# Patient Record
Sex: MALE | Race: ASIAN | NOT HISPANIC OR LATINO | URBAN - METROPOLITAN AREA
[De-identification: names, ages, dates, MRNs, and addresses within clinical notes are randomized per-mention and may not be internally consistent; named-entity substitution may affect disease eponyms.]

---

## 2017-11-02 ENCOUNTER — INPATIENT (INPATIENT)
Facility: HOSPITAL | Age: 52
LOS: 3 days | Discharge: HOME | DRG: 65 | End: 2017-11-06
Attending: NEUROLOGICAL SURGERY | Admitting: NEUROLOGICAL SURGERY
Payer: COMMERCIAL

## 2017-11-02 ENCOUNTER — TRANSCRIPTION ENCOUNTER (OUTPATIENT)
Age: 52
End: 2017-11-02

## 2017-11-02 VITALS
RESPIRATION RATE: 18 BRPM | TEMPERATURE: 99 F | DIASTOLIC BLOOD PRESSURE: 94 MMHG | SYSTOLIC BLOOD PRESSURE: 150 MMHG | HEART RATE: 83 BPM | OXYGEN SATURATION: 100 %

## 2017-11-02 DIAGNOSIS — I60.9 NONTRAUMATIC SUBARACHNOID HEMORRHAGE, UNSPECIFIED: ICD-10-CM

## 2017-11-02 LAB
ALBUMIN SERPL ELPH-MCNC: 4.4 G/DL — SIGNIFICANT CHANGE UP (ref 3.3–5)
ALP SERPL-CCNC: 88 U/L — SIGNIFICANT CHANGE UP (ref 40–120)
ALT FLD-CCNC: 31 U/L RC — SIGNIFICANT CHANGE UP (ref 10–45)
ANION GAP SERPL CALC-SCNC: 13 MMOL/L — SIGNIFICANT CHANGE UP (ref 5–17)
ANION GAP SERPL CALC-SCNC: 14 MMOL/L — SIGNIFICANT CHANGE UP (ref 5–17)
APPEARANCE UR: CLEAR — SIGNIFICANT CHANGE UP
APTT BLD: 35 SEC — SIGNIFICANT CHANGE UP (ref 27.5–37.4)
AST SERPL-CCNC: 27 U/L — SIGNIFICANT CHANGE UP (ref 10–40)
BASE EXCESS BLDV CALC-SCNC: 2.6 MMOL/L — HIGH (ref -2–2)
BASOPHILS # BLD AUTO: 0 K/UL — SIGNIFICANT CHANGE UP (ref 0–0.2)
BASOPHILS NFR BLD AUTO: 0.2 % — SIGNIFICANT CHANGE UP (ref 0–2)
BILIRUB SERPL-MCNC: 1.2 MG/DL — SIGNIFICANT CHANGE UP (ref 0.2–1.2)
BILIRUB UR-MCNC: NEGATIVE — SIGNIFICANT CHANGE UP
BLD GP AB SCN SERPL QL: NEGATIVE — SIGNIFICANT CHANGE UP
BUN SERPL-MCNC: 11 MG/DL — SIGNIFICANT CHANGE UP (ref 7–23)
BUN SERPL-MCNC: 12 MG/DL — SIGNIFICANT CHANGE UP (ref 7–23)
CA-I SERPL-SCNC: 1.18 MMOL/L — SIGNIFICANT CHANGE UP (ref 1.12–1.3)
CALCIUM SERPL-MCNC: 9.1 MG/DL — SIGNIFICANT CHANGE UP (ref 8.4–10.5)
CALCIUM SERPL-MCNC: 9.7 MG/DL — SIGNIFICANT CHANGE UP (ref 8.4–10.5)
CHLORIDE BLDV-SCNC: 97 MMOL/L — SIGNIFICANT CHANGE UP (ref 96–108)
CHLORIDE SERPL-SCNC: 94 MMOL/L — LOW (ref 96–108)
CHLORIDE SERPL-SCNC: 96 MMOL/L — SIGNIFICANT CHANGE UP (ref 96–108)
CO2 BLDV-SCNC: 29 MMOL/L — SIGNIFICANT CHANGE UP (ref 22–30)
CO2 SERPL-SCNC: 24 MMOL/L — SIGNIFICANT CHANGE UP (ref 22–31)
CO2 SERPL-SCNC: 24 MMOL/L — SIGNIFICANT CHANGE UP (ref 22–31)
COLOR SPEC: COLORLESS — SIGNIFICANT CHANGE UP
CREAT SERPL-MCNC: 1.06 MG/DL — SIGNIFICANT CHANGE UP (ref 0.5–1.3)
CREAT SERPL-MCNC: 1.25 MG/DL — SIGNIFICANT CHANGE UP (ref 0.5–1.3)
DIFF PNL FLD: NEGATIVE — SIGNIFICANT CHANGE UP
EOSINOPHIL # BLD AUTO: 0 K/UL — SIGNIFICANT CHANGE UP (ref 0–0.5)
EOSINOPHIL NFR BLD AUTO: 0.3 % — SIGNIFICANT CHANGE UP (ref 0–6)
ERYTHROCYTE [SEDIMENTATION RATE] IN BLOOD: 29 MM/HR — HIGH (ref 0–20)
GAS PNL BLDV: 131 MMOL/L — LOW (ref 136–145)
GAS PNL BLDV: SIGNIFICANT CHANGE UP
GLUCOSE BLDV-MCNC: 108 MG/DL — HIGH (ref 70–99)
GLUCOSE SERPL-MCNC: 101 MG/DL — HIGH (ref 70–99)
GLUCOSE SERPL-MCNC: 108 MG/DL — HIGH (ref 70–99)
GLUCOSE UR QL: NEGATIVE — SIGNIFICANT CHANGE UP
HCO3 BLDV-SCNC: 28 MMOL/L — SIGNIFICANT CHANGE UP (ref 21–29)
HCT VFR BLD CALC: 46.3 % — SIGNIFICANT CHANGE UP (ref 39–50)
HCT VFR BLD CALC: 48.1 % — SIGNIFICANT CHANGE UP (ref 39–50)
HCT VFR BLDA CALC: 52 % — HIGH (ref 39–50)
HGB BLD CALC-MCNC: 16.9 G/DL — SIGNIFICANT CHANGE UP (ref 13–17)
HGB BLD-MCNC: 16.4 G/DL — SIGNIFICANT CHANGE UP (ref 13–17)
HGB BLD-MCNC: 16.6 G/DL — SIGNIFICANT CHANGE UP (ref 13–17)
INR BLD: 1.2 RATIO — HIGH (ref 0.88–1.16)
KETONES UR-MCNC: NEGATIVE — SIGNIFICANT CHANGE UP
LACTATE BLDV-MCNC: 1.3 MMOL/L — SIGNIFICANT CHANGE UP (ref 0.7–2)
LEUKOCYTE ESTERASE UR-ACNC: NEGATIVE — SIGNIFICANT CHANGE UP
LYMPHOCYTES # BLD AUTO: 2.5 K/UL — SIGNIFICANT CHANGE UP (ref 1–3.3)
LYMPHOCYTES # BLD AUTO: 20 % — SIGNIFICANT CHANGE UP (ref 13–44)
MAGNESIUM SERPL-MCNC: 2.1 MG/DL — SIGNIFICANT CHANGE UP (ref 1.6–2.6)
MCHC RBC-ENTMCNC: 31.9 PG — SIGNIFICANT CHANGE UP (ref 27–34)
MCHC RBC-ENTMCNC: 32.9 PG — SIGNIFICANT CHANGE UP (ref 27–34)
MCHC RBC-ENTMCNC: 34.5 GM/DL — SIGNIFICANT CHANGE UP (ref 32–36)
MCHC RBC-ENTMCNC: 35.4 GM/DL — SIGNIFICANT CHANGE UP (ref 32–36)
MCV RBC AUTO: 92.5 FL — SIGNIFICANT CHANGE UP (ref 80–100)
MCV RBC AUTO: 92.9 FL — SIGNIFICANT CHANGE UP (ref 80–100)
MONOCYTES # BLD AUTO: 1.1 K/UL — HIGH (ref 0–0.9)
MONOCYTES NFR BLD AUTO: 8.9 % — SIGNIFICANT CHANGE UP (ref 2–14)
NEUTROPHILS # BLD AUTO: 8.7 K/UL — HIGH (ref 1.8–7.4)
NEUTROPHILS NFR BLD AUTO: 70.6 % — SIGNIFICANT CHANGE UP (ref 43–77)
NITRITE UR-MCNC: NEGATIVE — SIGNIFICANT CHANGE UP
OTHER CELLS CSF MANUAL: 12 ML/DL — LOW (ref 18–22)
PCO2 BLDV: 46 MMHG — SIGNIFICANT CHANGE UP (ref 35–50)
PH BLDV: 7.4 — SIGNIFICANT CHANGE UP (ref 7.35–7.45)
PH UR: 7 — SIGNIFICANT CHANGE UP (ref 5–8)
PHOSPHATE SERPL-MCNC: 2.9 MG/DL — SIGNIFICANT CHANGE UP (ref 2.5–4.5)
PLATELET # BLD AUTO: 304 K/UL — SIGNIFICANT CHANGE UP (ref 150–400)
PLATELET # BLD AUTO: 330 K/UL — SIGNIFICANT CHANGE UP (ref 150–400)
PO2 BLDV: 26 MMHG — SIGNIFICANT CHANGE UP (ref 25–45)
POTASSIUM BLDV-SCNC: 4.1 MMOL/L — SIGNIFICANT CHANGE UP (ref 3.5–5)
POTASSIUM SERPL-MCNC: 4.5 MMOL/L — SIGNIFICANT CHANGE UP (ref 3.5–5.3)
POTASSIUM SERPL-MCNC: 4.5 MMOL/L — SIGNIFICANT CHANGE UP (ref 3.5–5.3)
POTASSIUM SERPL-SCNC: 4.5 MMOL/L — SIGNIFICANT CHANGE UP (ref 3.5–5.3)
POTASSIUM SERPL-SCNC: 4.5 MMOL/L — SIGNIFICANT CHANGE UP (ref 3.5–5.3)
PROT SERPL-MCNC: 8.1 G/DL — SIGNIFICANT CHANGE UP (ref 6–8.3)
PROT UR-MCNC: NEGATIVE — SIGNIFICANT CHANGE UP
PROTHROM AB SERPL-ACNC: 13.1 SEC — HIGH (ref 9.8–12.7)
RAPID RVP RESULT: SIGNIFICANT CHANGE UP
RBC # BLD: 4.99 M/UL — SIGNIFICANT CHANGE UP (ref 4.2–5.8)
RBC # BLD: 5.2 M/UL — SIGNIFICANT CHANGE UP (ref 4.2–5.8)
RBC # FLD: 11.8 % — SIGNIFICANT CHANGE UP (ref 10.3–14.5)
RBC # FLD: 11.9 % — SIGNIFICANT CHANGE UP (ref 10.3–14.5)
RH IG SCN BLD-IMP: POSITIVE — SIGNIFICANT CHANGE UP
RH IG SCN BLD-IMP: POSITIVE — SIGNIFICANT CHANGE UP
SAO2 % BLDV: 50 % — LOW (ref 67–88)
SODIUM SERPL-SCNC: 132 MMOL/L — LOW (ref 135–145)
SODIUM SERPL-SCNC: 133 MMOL/L — LOW (ref 135–145)
SP GR SPEC: >1.03 — HIGH (ref 1.01–1.02)
UROBILINOGEN FLD QL: NEGATIVE — SIGNIFICANT CHANGE UP
WBC # BLD: 12.3 K/UL — HIGH (ref 3.8–10.5)
WBC # BLD: 12.9 K/UL — HIGH (ref 3.8–10.5)
WBC # FLD AUTO: 12.3 K/UL — HIGH (ref 3.8–10.5)
WBC # FLD AUTO: 12.9 K/UL — HIGH (ref 3.8–10.5)

## 2017-11-02 PROCEDURE — 70450 CT HEAD/BRAIN W/O DYE: CPT | Mod: 26,59

## 2017-11-02 PROCEDURE — 99291 CRITICAL CARE FIRST HOUR: CPT

## 2017-11-02 PROCEDURE — 93010 ELECTROCARDIOGRAM REPORT: CPT

## 2017-11-02 PROCEDURE — 71010: CPT | Mod: 26

## 2017-11-02 PROCEDURE — 70498 CT ANGIOGRAPHY NECK: CPT | Mod: 26

## 2017-11-02 PROCEDURE — 99285 EMERGENCY DEPT VISIT HI MDM: CPT | Mod: 25

## 2017-11-02 PROCEDURE — 70496 CT ANGIOGRAPHY HEAD: CPT | Mod: 26

## 2017-11-02 RX ORDER — ACETAMINOPHEN 500 MG
650 TABLET ORAL EVERY 6 HOURS
Qty: 0 | Refills: 0 | Status: DISCONTINUED | OUTPATIENT
Start: 2017-11-02 | End: 2017-11-03

## 2017-11-02 RX ORDER — DOCUSATE SODIUM 100 MG
100 CAPSULE ORAL
Qty: 0 | Refills: 0 | Status: DISCONTINUED | OUTPATIENT
Start: 2017-11-02 | End: 2017-11-06

## 2017-11-02 RX ORDER — LEVETIRACETAM 250 MG/1
1000 TABLET, FILM COATED ORAL EVERY 12 HOURS
Qty: 0 | Refills: 0 | Status: DISCONTINUED | OUTPATIENT
Start: 2017-11-02 | End: 2017-11-02

## 2017-11-02 RX ORDER — NIMODIPINE 60 MG/10ML
60 SOLUTION ORAL EVERY 4 HOURS
Qty: 0 | Refills: 0 | Status: DISCONTINUED | OUTPATIENT
Start: 2017-11-02 | End: 2017-11-02

## 2017-11-02 RX ORDER — ONDANSETRON 8 MG/1
4 TABLET, FILM COATED ORAL ONCE
Qty: 0 | Refills: 0 | Status: COMPLETED | OUTPATIENT
Start: 2017-11-02 | End: 2017-11-02

## 2017-11-02 RX ORDER — METOCLOPRAMIDE HCL 10 MG
10 TABLET ORAL ONCE
Qty: 0 | Refills: 0 | Status: COMPLETED | OUTPATIENT
Start: 2017-11-02 | End: 2017-11-02

## 2017-11-02 RX ORDER — OXYCODONE AND ACETAMINOPHEN 5; 325 MG/1; MG/1
2 TABLET ORAL EVERY 6 HOURS
Qty: 0 | Refills: 0 | Status: DISCONTINUED | OUTPATIENT
Start: 2017-11-02 | End: 2017-11-04

## 2017-11-02 RX ORDER — SODIUM CHLORIDE 9 MG/ML
3 INJECTION INTRAMUSCULAR; INTRAVENOUS; SUBCUTANEOUS ONCE
Qty: 0 | Refills: 0 | Status: COMPLETED | OUTPATIENT
Start: 2017-11-02 | End: 2017-11-02

## 2017-11-02 RX ORDER — SODIUM CHLORIDE 9 MG/ML
1000 INJECTION INTRAMUSCULAR; INTRAVENOUS; SUBCUTANEOUS ONCE
Qty: 0 | Refills: 0 | Status: COMPLETED | OUTPATIENT
Start: 2017-11-02 | End: 2017-11-02

## 2017-11-02 RX ORDER — NICARDIPINE HYDROCHLORIDE 30 MG/1
3 CAPSULE, EXTENDED RELEASE ORAL
Qty: 40 | Refills: 0 | Status: DISCONTINUED | OUTPATIENT
Start: 2017-11-02 | End: 2017-11-02

## 2017-11-02 RX ORDER — SENNA PLUS 8.6 MG/1
1 TABLET ORAL DAILY
Qty: 0 | Refills: 0 | Status: DISCONTINUED | OUTPATIENT
Start: 2017-11-02 | End: 2017-11-06

## 2017-11-02 RX ORDER — OXYCODONE AND ACETAMINOPHEN 5; 325 MG/1; MG/1
1 TABLET ORAL EVERY 6 HOURS
Qty: 0 | Refills: 0 | Status: DISCONTINUED | OUTPATIENT
Start: 2017-11-02 | End: 2017-11-04

## 2017-11-02 RX ORDER — ACETAMINOPHEN 500 MG
1000 TABLET ORAL ONCE
Qty: 0 | Refills: 0 | Status: COMPLETED | OUTPATIENT
Start: 2017-11-02 | End: 2017-11-02

## 2017-11-02 RX ORDER — DEXTROSE MONOHYDRATE, SODIUM CHLORIDE, AND POTASSIUM CHLORIDE 50; .745; 4.5 G/1000ML; G/1000ML; G/1000ML
1000 INJECTION, SOLUTION INTRAVENOUS
Qty: 0 | Refills: 0 | Status: DISCONTINUED | OUTPATIENT
Start: 2017-11-02 | End: 2017-11-03

## 2017-11-02 RX ADMIN — Medication 400 MILLIGRAM(S): at 17:16

## 2017-11-02 RX ADMIN — NICARDIPINE HYDROCHLORIDE 15 MG/HR: 30 CAPSULE, EXTENDED RELEASE ORAL at 18:17

## 2017-11-02 RX ADMIN — ONDANSETRON 4 MILLIGRAM(S): 8 TABLET, FILM COATED ORAL at 17:25

## 2017-11-02 RX ADMIN — Medication 10 MILLIGRAM(S): at 17:17

## 2017-11-02 RX ADMIN — Medication 1000 MILLIGRAM(S): at 18:16

## 2017-11-02 RX ADMIN — LEVETIRACETAM 400 MILLIGRAM(S): 250 TABLET, FILM COATED ORAL at 18:00

## 2017-11-02 RX ADMIN — SODIUM CHLORIDE 3 MILLILITER(S): 9 INJECTION INTRAMUSCULAR; INTRAVENOUS; SUBCUTANEOUS at 18:17

## 2017-11-02 RX ADMIN — SODIUM CHLORIDE 2000 MILLILITER(S): 9 INJECTION INTRAMUSCULAR; INTRAVENOUS; SUBCUTANEOUS at 18:17

## 2017-11-02 NOTE — DISCHARGE NOTE ADULT - CARE PROVIDER_API CALL
Remy Chu), Internal Medicine; Medical Oncology  [address] [facility]  [address]  [place] [address]  Phone: [phone]  Fax: [phone]    Javy Baumann), Neurological Surgery  [address]  [address] [place] [address]  Phone: [phone]  Fax: [phone]    Eduar Stewart), Neurology; Vascular Neurology  [address]  [address] [place] [address]  Phone: [phone]  Fax: [phone] Remy Chu (MD), Internal Medicine; Medical Oncology  [address] [facility]  [address]  [place] [address]  Phone: [phone]  Fax: [phone]    Javy Baumann (MD), Neurological Surgery  [address]  [address] [place] [address]  Phone: [phone]  Fax: [phone]    Eduar Stewart (MD), Neurology; Vascular Neurology  [address]  [address] [place] [address]  Phone: [phone]  Fax: [phone]    Juni Martinez (DO), Neurology; Vascular Neurology  [address] [facility] [address]  [place] [address]  Phone: [phone]  Fax: [phone]

## 2017-11-02 NOTE — DISCHARGE NOTE ADULT - PLAN OF CARE
clinical improvement in headache above diet.  activity as tolerated, non strenuous  must have repeat sodium level (bmp/chemistry) in 5 days as outpatient

## 2017-11-02 NOTE — DISCHARGE NOTE ADULT - HOSPITAL COURSE
52M with no significant past medical history presents with severe sudden onset headache on 10/26 that woke him up out of sleep in the morning with associated nausea, vomiting, malaise. Because of persistent symptoms he came to ED where CT head showed trace high R frontal parietal SAH. No history of trauma. He has had associated fevers since headache began, last fever yesterday 101.9. He denies history of valvular disease, IV drug use. Prior to this week he has been feeling healthy although with much stress in his personal life. He was previously on ASA81, last dose 10/26.    Otherwise healthy male presents to hospital 11/2 after onset of headache originally on 10/26.  had cerebral angio  done 11/3 with negative result, had negative brain MRI/MRA/MRV on 11/4.  initially presented with sodium 132, now being discharged  with sodium level 135.  importance of need for 5 day followup serum sodium check reviewed, needs to follow up with neurosurgeon  in 1-2 weeks.  pt. will call office for appointment.

## 2017-11-02 NOTE — H&P ADULT - ASSESSMENT
52M with spontaneous high R frontal SAH concerning for aneurysmal ?mycotic rupture. Patient is likely post-bleed day 7 from history and neurologically intact.    -Admit to NSCU under Dr. Baumann  -Neurochesusana q1h  -CTA head/neck  -Keppra  -Nimodipine  -TTE  -SBP <140  -Will plan for angiogram vs. MRI after CTA complete

## 2017-11-02 NOTE — DISCHARGE NOTE ADULT - PATIENT PORTAL LINK FT
“You can access the [facility] Patient Portal, offered by [facility], by registering with the following website: [email or web address]”

## 2017-11-02 NOTE — DISCHARGE NOTE ADULT - CARE PLAN
Principal Discharge DX:	SAH (subarachnoid hemorrhage)  Goal:	clinical improvement in headache  Instructions for follow-up, activity and diet:	above diet.  activity as tolerated, non strenuous  must have repeat sodium level (bmp/chemistry) in 5 days as outpatient

## 2017-11-02 NOTE — PROGRESS NOTE ADULT - ASSESSMENT
52M with fevers and headache found to have high R frontal SAH without any hx of trauma; r/o mycotic aneurysm, cns infection    Assessment:     Neurological: pre-op for angio in AM; consider LP   CT in AM; Neuro Checks q1; Pain Control; PT/OT; OOB     CVS:   SBP Goal:     Pulm:   Maintain sats > 92%    Renal:   IVF while NPO     GI: NPO   Bowel Regimen  GI PPX:  not indicated    ID: culture if spikes    Heme/Onc:   Pt at high risk of dvt on admission [] check dopplers []  SCDS; chemoprophylaxis: hold until angio     Endocrine:   FS; ISS    CODE STATUS: Full Code     DISPOSITION: ICU    Patient is at high risk of neurologic deterioration/death due to: CNS hemorrhage     Time spent: 35 critical care minutes    Hiral Aguiar MD 52M with fevers and headache found to have high R frontal SAH without any hx of trauma; r/o mycotic aneurysm, cns infection    Assessment:     Neurological: pre-op for angio in AM; consider LP   CT in AM; Neuro Checks q1; Pain Control; PT/OT; OOB   send CRP, ESR, utox    CVS:   SBP Goal:     Pulm:   Maintain sats > 92%    Renal:   IVF while NPO     GI: NPO   Bowel Regimen  GI PPX:  not indicated    ID: culture if spikes    Heme/Onc:   Pt at high risk of dvt on admission [] check dopplers []  SCDS; chemoprophylaxis: hold until angio     Endocrine:   FS; ISS    CODE STATUS: Full Code     DISPOSITION: ICU    Patient is at high risk of neurologic deterioration/death due to: CNS hemorrhage     Time spent: 35 critical care minutes    Hiral Aguiar MD

## 2017-11-02 NOTE — DISCHARGE NOTE ADULT - ADDITIONAL INSTRUCTIONS
call dr. hung's office for appt. for office visit in 1-2 weeks.    call his office to have sodium level checked within next 5 days.  this is mandatory.

## 2017-11-02 NOTE — DISCHARGE NOTE ADULT - NS AS DC STROKE ED MATERIALS
Risk Factors for Stroke/Stroke Education Booklet/Stroke Warning Signs and Symptoms/Call 911 for Stroke/Prescribed Medications/Need for Followup After Discharge

## 2017-11-02 NOTE — DISCHARGE NOTE ADULT - MEDICATION SUMMARY - MEDICATIONS TO TAKE
I will START or STAY ON the medications listed below when I get home from the hospital:    Tylenol 325 mg oral tablet  -- 2 tab(s) by mouth every 4 hours, As Needed  for pain  -- Indication: For headache    senna oral tablet  -- 1 tab(s) by mouth once a day  -- Indication: For bowel    docusate sodium 100 mg oral capsule  -- 1 cap(s) by mouth 2 times a day  -- Indication: For bowel

## 2017-11-02 NOTE — DISCHARGE NOTE ADULT - CARE PROVIDERS DIRECT ADDRESSES
,[email or web address],DirectAddress_Unknown

## 2017-11-02 NOTE — ED ADULT NURSE NOTE - OBJECTIVE STATEMENT
Pt is an ambulatory 52 yr old male a/oX 3 c/o worst headache of his life that started and woke him from his sleep 5 days ago.  PERRl wnl, nagel with equal strength.  C/o photophobia and nucchal rigidity.  Lungs CTA no chest pain or sob.  NSR on cm at 77 bpm.  C/o fevers, chills and N/V.  Abdomen NT ND with BS+4Q.  SKIN WDI.  Peripheral pulses +2bl no edema

## 2017-11-02 NOTE — ED PROVIDER NOTE - OBJECTIVE STATEMENT
52 y.o. male no medical history on ASA but has not taken for 7 days was awoken from sleep 5 days ago with the worse headache of his life.  Since he has been with persistent headache, + documented fever at home of 101 but none today with some neck stiffness.  Some transient numbness at home but none currently.    Pt taken to CT immediately after I took this history.

## 2017-11-02 NOTE — ED PROVIDER NOTE - ATTENDING CONTRIBUTION TO CARE
Attending MD Jennings:   I personally have seen and examined this patient.  Physician assistant note reviewed and agree on plan of care and except where noted.  See below for details.     52M with no reported PMH/PSH/Meds/Allergies presents to the ED with headache.  Reports that 5 days ago was awakened from sleep with the "worst headache of [his] life".  Reporst associated nausea, vomiting, malaise. Reports that since then the headache has not improved.  Reports decided to come in to the ED because his symptoms have not improved and still has severe headache.  Reports since then has also developed fevers, T 101 at home, denies any fever today.  Reports mild neck stiffness.  Denies change in vision, double vision, sudden loss of vision.  Denies chest pain, shortness of breath, palpitations. Denies abdominal pain, nausea, vomiting, diarrhea, blood in stools. Denies loss of urinary or bowel continence. Denies LOC.  On exam, CN 2-12 grossly intact, head NCAT, PERRL, FROM at neck, no tenderness to palpation or stepoffs along length of spine, lungs CTAB with good inspiratory effort, +S1S2, no m/r/g, abdomen soft with +BS, NT, ND, no CVAT, moving all extremities with 5/5 strength bilateral upper and lower extremities, good and equal  strength bilaterally, sensory grossly intact; A/P: 52M with high suspicion for SAH, patient taken to CT Head emergently after history taken, labs, IVFs, antiemetic, pain control, likely neuro vs neurosx consult, admit

## 2017-11-02 NOTE — DISCHARGE NOTE ADULT - NS AS ACTIVITY OBS
Walking-Indoors allowed/Showering allowed/Walking-Outdoors allowed/Stairs allowed/No Heavy lifting/straining/Do not drive or operate machinery

## 2017-11-02 NOTE — DISCHARGE NOTE ADULT - PROVIDER TOKENS
TOKEN:'[id number]:'[id number]:'[id number]' TOKEN:'[id number]:'[id number]:'[id number]:'[id number]'

## 2017-11-02 NOTE — H&P ADULT - HISTORY OF PRESENT ILLNESS
52M with no significant past medical history presents with severe sudden onset headache on 10/26 that woke him up out of sleep in the morning with associated nausea, vomiting, malaise. Because of persistent symptoms he came to ED where CT head showed trace high R frontal parietal SAH. No history of trauma. He has had associated fevers since headache began, last fever yesterday 101.9. He denies history of valvular disease, IV drug use. Prior to this week he has been feeling healthy although with much stress in his personal life. He was previously on ASA81, last dose 10/26.

## 2017-11-02 NOTE — H&P ADULT - NSHPPHYSICALEXAM_GEN_ALL_CORE
AOx3, Following Commands    CN: PERRL, EOMI, V1-3 intact, no facial droop appreciated, palate elevation symmetric, tongue midline, shoulder shrug 5/5    Motor: 5/5 throughout, no drift    Sensation: intact to light touch    Reflexes: No clonus or babinski    Gait: not assessed

## 2017-11-03 LAB
AMPHET UR-MCNC: NEGATIVE — SIGNIFICANT CHANGE UP
ANION GAP SERPL CALC-SCNC: 12 MMOL/L — SIGNIFICANT CHANGE UP (ref 5–17)
ANION GAP SERPL CALC-SCNC: 16 MMOL/L — SIGNIFICANT CHANGE UP (ref 5–17)
BARBITURATES UR SCN-MCNC: NEGATIVE — SIGNIFICANT CHANGE UP
BASOPHILS # BLD AUTO: 0.1 K/UL — SIGNIFICANT CHANGE UP (ref 0–0.2)
BASOPHILS NFR BLD AUTO: 0.6 % — SIGNIFICANT CHANGE UP (ref 0–2)
BENZODIAZ UR-MCNC: NEGATIVE — SIGNIFICANT CHANGE UP
BUN SERPL-MCNC: 12 MG/DL — SIGNIFICANT CHANGE UP (ref 7–23)
BUN SERPL-MCNC: 14 MG/DL — SIGNIFICANT CHANGE UP (ref 7–23)
CALCIUM SERPL-MCNC: 9.5 MG/DL — SIGNIFICANT CHANGE UP (ref 8.4–10.5)
CALCIUM SERPL-MCNC: 9.6 MG/DL — SIGNIFICANT CHANGE UP (ref 8.4–10.5)
CHLORIDE SERPL-SCNC: 97 MMOL/L — SIGNIFICANT CHANGE UP (ref 96–108)
CHLORIDE SERPL-SCNC: 98 MMOL/L — SIGNIFICANT CHANGE UP (ref 96–108)
CHOLEST SERPL-MCNC: 169 MG/DL — SIGNIFICANT CHANGE UP (ref 10–199)
CO2 SERPL-SCNC: 21 MMOL/L — LOW (ref 22–31)
CO2 SERPL-SCNC: 27 MMOL/L — SIGNIFICANT CHANGE UP (ref 22–31)
COCAINE METAB.OTHER UR-MCNC: NEGATIVE — SIGNIFICANT CHANGE UP
CREAT SERPL-MCNC: 1.08 MG/DL — SIGNIFICANT CHANGE UP (ref 0.5–1.3)
CREAT SERPL-MCNC: 1.23 MG/DL — SIGNIFICANT CHANGE UP (ref 0.5–1.3)
CRP SERPL-MCNC: 0.5 MG/DL — HIGH (ref 0–0.4)
CULTURE RESULTS: NO GROWTH — SIGNIFICANT CHANGE UP
EOSINOPHIL # BLD AUTO: 0.1 K/UL — SIGNIFICANT CHANGE UP (ref 0–0.5)
EOSINOPHIL NFR BLD AUTO: 0.5 % — SIGNIFICANT CHANGE UP (ref 0–6)
GLUCOSE SERPL-MCNC: 133 MG/DL — HIGH (ref 70–99)
GLUCOSE SERPL-MCNC: 79 MG/DL — SIGNIFICANT CHANGE UP (ref 70–99)
HBA1C BLD-MCNC: 5.2 % — SIGNIFICANT CHANGE UP (ref 4–5.6)
HCT VFR BLD CALC: 45.7 % — SIGNIFICANT CHANGE UP (ref 39–50)
HCT VFR BLD CALC: 49.2 % — SIGNIFICANT CHANGE UP (ref 39–50)
HDLC SERPL-MCNC: 61 MG/DL — SIGNIFICANT CHANGE UP (ref 40–125)
HGB BLD-MCNC: 16.2 G/DL — SIGNIFICANT CHANGE UP (ref 13–17)
HGB BLD-MCNC: 16.5 G/DL — SIGNIFICANT CHANGE UP (ref 13–17)
LIPID PNL WITH DIRECT LDL SERPL: 84 MG/DL — SIGNIFICANT CHANGE UP
LYMPHOCYTES # BLD AUTO: 23.9 % — SIGNIFICANT CHANGE UP (ref 13–44)
LYMPHOCYTES # BLD AUTO: 3.1 K/UL — SIGNIFICANT CHANGE UP (ref 1–3.3)
MAGNESIUM SERPL-MCNC: 2.3 MG/DL — SIGNIFICANT CHANGE UP (ref 1.6–2.6)
MCHC RBC-ENTMCNC: 31.6 PG — SIGNIFICANT CHANGE UP (ref 27–34)
MCHC RBC-ENTMCNC: 33 PG — SIGNIFICANT CHANGE UP (ref 27–34)
MCHC RBC-ENTMCNC: 33.5 GM/DL — SIGNIFICANT CHANGE UP (ref 32–36)
MCHC RBC-ENTMCNC: 35.5 GM/DL — SIGNIFICANT CHANGE UP (ref 32–36)
MCV RBC AUTO: 93 FL — SIGNIFICANT CHANGE UP (ref 80–100)
MCV RBC AUTO: 94.4 FL — SIGNIFICANT CHANGE UP (ref 80–100)
METHADONE UR-MCNC: NEGATIVE — SIGNIFICANT CHANGE UP
MONOCYTES # BLD AUTO: 1.2 K/UL — HIGH (ref 0–0.9)
MONOCYTES NFR BLD AUTO: 9.1 % — SIGNIFICANT CHANGE UP (ref 2–14)
NEUTROPHILS # BLD AUTO: 8.6 K/UL — HIGH (ref 1.8–7.4)
NEUTROPHILS NFR BLD AUTO: 65.9 % — SIGNIFICANT CHANGE UP (ref 43–77)
OPIATES UR-MCNC: NEGATIVE — SIGNIFICANT CHANGE UP
OXYCODONE UR-MCNC: NEGATIVE — SIGNIFICANT CHANGE UP
PCP SPEC-MCNC: SIGNIFICANT CHANGE UP
PCP UR-MCNC: NEGATIVE — SIGNIFICANT CHANGE UP
PHOSPHATE SERPL-MCNC: 3.1 MG/DL — SIGNIFICANT CHANGE UP (ref 2.5–4.5)
PLATELET # BLD AUTO: 307 K/UL — SIGNIFICANT CHANGE UP (ref 150–400)
PLATELET # BLD AUTO: 315 K/UL — SIGNIFICANT CHANGE UP (ref 150–400)
POTASSIUM SERPL-MCNC: 4.7 MMOL/L — SIGNIFICANT CHANGE UP (ref 3.5–5.3)
POTASSIUM SERPL-MCNC: 5 MMOL/L — SIGNIFICANT CHANGE UP (ref 3.5–5.3)
POTASSIUM SERPL-SCNC: 4.7 MMOL/L — SIGNIFICANT CHANGE UP (ref 3.5–5.3)
POTASSIUM SERPL-SCNC: 5 MMOL/L — SIGNIFICANT CHANGE UP (ref 3.5–5.3)
RBC # BLD: 4.92 M/UL — SIGNIFICANT CHANGE UP (ref 4.2–5.8)
RBC # BLD: 5.21 M/UL — SIGNIFICANT CHANGE UP (ref 4.2–5.8)
RBC # FLD: 11.6 % — SIGNIFICANT CHANGE UP (ref 10.3–14.5)
RBC # FLD: 11.8 % — SIGNIFICANT CHANGE UP (ref 10.3–14.5)
SODIUM SERPL-SCNC: 134 MMOL/L — LOW (ref 135–145)
SODIUM SERPL-SCNC: 137 MMOL/L — SIGNIFICANT CHANGE UP (ref 135–145)
SPECIMEN SOURCE: SIGNIFICANT CHANGE UP
T4 FREE+ TSH PNL SERPL: 0.83 UIU/ML — SIGNIFICANT CHANGE UP (ref 0.27–4.2)
THC UR QL: NEGATIVE — SIGNIFICANT CHANGE UP
TOTAL CHOLESTEROL/HDL RATIO MEASUREMENT: 2.8 RATIO — LOW (ref 3.4–9.6)
TRIGL SERPL-MCNC: 122 MG/DL — SIGNIFICANT CHANGE UP (ref 10–149)
WBC # BLD: 12.4 K/UL — HIGH (ref 3.8–10.5)
WBC # BLD: 13 K/UL — HIGH (ref 3.8–10.5)
WBC # FLD AUTO: 12.4 K/UL — HIGH (ref 3.8–10.5)
WBC # FLD AUTO: 13 K/UL — HIGH (ref 3.8–10.5)

## 2017-11-03 PROCEDURE — 99291 CRITICAL CARE FIRST HOUR: CPT

## 2017-11-03 PROCEDURE — 36224 PLACE CATH CAROTD ART: CPT | Mod: RT

## 2017-11-03 PROCEDURE — 99255 IP/OBS CONSLTJ NEW/EST HI 80: CPT

## 2017-11-03 PROCEDURE — 70450 CT HEAD/BRAIN W/O DYE: CPT | Mod: 26

## 2017-11-03 PROCEDURE — 36226 PLACE CATH VERTEBRAL ART: CPT | Mod: 50

## 2017-11-03 PROCEDURE — 36223 PLACE CATH CAROTID/INOM ART: CPT | Mod: 59,LT

## 2017-11-03 PROCEDURE — 36227 PLACE CATH XTRNL CAROTID: CPT | Mod: 50

## 2017-11-03 RX ORDER — SODIUM CHLORIDE 9 MG/ML
1 INJECTION INTRAMUSCULAR; INTRAVENOUS; SUBCUTANEOUS EVERY 6 HOURS
Qty: 0 | Refills: 0 | Status: DISCONTINUED | OUTPATIENT
Start: 2017-11-03 | End: 2017-11-06

## 2017-11-03 RX ORDER — ACETAMINOPHEN 500 MG
1000 TABLET ORAL ONCE
Qty: 0 | Refills: 0 | Status: COMPLETED | OUTPATIENT
Start: 2017-11-03 | End: 2017-11-03

## 2017-11-03 RX ORDER — ENOXAPARIN SODIUM 100 MG/ML
40 INJECTION SUBCUTANEOUS
Qty: 0 | Refills: 0 | Status: DISCONTINUED | OUTPATIENT
Start: 2017-11-03 | End: 2017-11-06

## 2017-11-03 RX ADMIN — DEXTROSE MONOHYDRATE, SODIUM CHLORIDE, AND POTASSIUM CHLORIDE 75 MILLILITER(S): 50; .745; 4.5 INJECTION, SOLUTION INTRAVENOUS at 06:56

## 2017-11-03 RX ADMIN — SODIUM CHLORIDE 1 GRAM(S): 9 INJECTION INTRAMUSCULAR; INTRAVENOUS; SUBCUTANEOUS at 05:07

## 2017-11-03 RX ADMIN — Medication 400 MILLIGRAM(S): at 16:20

## 2017-11-03 RX ADMIN — Medication 1000 MILLIGRAM(S): at 16:45

## 2017-11-03 RX ADMIN — Medication 400 MILLIGRAM(S): at 03:00

## 2017-11-03 RX ADMIN — ENOXAPARIN SODIUM 40 MILLIGRAM(S): 100 INJECTION SUBCUTANEOUS at 18:16

## 2017-11-03 RX ADMIN — Medication 400 MILLIGRAM(S): at 10:30

## 2017-11-03 RX ADMIN — OXYCODONE AND ACETAMINOPHEN 1 TABLET(S): 5; 325 TABLET ORAL at 23:30

## 2017-11-03 RX ADMIN — SODIUM CHLORIDE 1 GRAM(S): 9 INJECTION INTRAMUSCULAR; INTRAVENOUS; SUBCUTANEOUS at 19:17

## 2017-11-03 RX ADMIN — Medication 1000 MILLIGRAM(S): at 03:15

## 2017-11-03 RX ADMIN — OXYCODONE AND ACETAMINOPHEN 1 TABLET(S): 5; 325 TABLET ORAL at 23:00

## 2017-11-03 RX ADMIN — SODIUM CHLORIDE 1 GRAM(S): 9 INJECTION INTRAMUSCULAR; INTRAVENOUS; SUBCUTANEOUS at 23:13

## 2017-11-03 RX ADMIN — Medication 1000 MILLIGRAM(S): at 10:45

## 2017-11-03 NOTE — PROGRESS NOTE ADULT - ASSESSMENT
ASSESSMENT/PLAN:  SAH-?CVT seen on angio. MRI pending-including MRV to fully evaluate  TCDS daily. TTE pending  f/u pending cxs    [x] Patient is at high risk of neurologic deterioration/death due to: SAH, cortical vein thrombosis,     Time seen: 10pm  Time spent: __40_ [x] critical care minutes

## 2017-11-03 NOTE — CHART NOTE - NSCHARTNOTEFT_GEN_A_CORE
Interventional Neuro Radiology  Pre-Procedure Note    This is a 53yo right hand dominant male with no significant past medical history presents with severe sudden onset headache on 10/26 that woke him up out of sleep in the morning with associated nausea, vomiting, malaise. Due to persistent symptoms he came to ED where CT head showed trace high right frontal parietal SAH. No history of trauma. He has had associated fevers since headache began, last fever yesterday 101.9. He denies history of valvular disease, IV drug use. Prior to this week he has been feeling healthy although with much stress in his personal life. He was previously on ASA81, last dose 10/26. Patient presents today to neuro IR for selective cerebral angiography, for further evaluation.     Neuro Exam: Awake and alert, oriented x3, fluent, normal naming and repetition, follows 3 step commands. Extraocular movements intact, no nystagmus, visual fields full, face symmetric, tongue midline. No drift, 5/5 power x 4 extremities. Normal sensation to LT. Normal finger-to-nose and rapid alternating movements.    PAST MEDICAL & SURGICAL HISTORY:  No pertinent past medical history  No significant past surgical history      Social History: denies tobacco    FAMILY HISTORY: no pertinent family hx      Allergies: No Known Allergies      Current Medications:   acetaminophen  IVPB. 1000 milliGRAM(s) IV Intermittent once  docusate sodium 100 milliGRAM(s) Oral two times a day  oxyCODONE    5 mG/acetaminophen 325 mG 1 Tablet(s) Oral every 6 hours PRN  senna 1 Tablet(s) Oral daily  sodium chloride 0.9% with potassium chloride 20 mEq/L 1000 milliLiter(s) IV Continuous <Continuous>      Labs:                         16.4   12.9  )-----------( 304      ( 02 Nov 2017 22:02 )             46.3       11-02    133<L>  |  96  |  11  ----------------------------<  101<H>  4.5   |  24  |  1.06    Ca    9.1      02 Nov 2017 22:02  Phos  2.9     11-02  Mg     2.1     11-02    TPro  8.1  /  Alb  4.4  /  TBili  1.2  /  DBili  x   /  AST  27  /  ALT  31  /  AlkPhos  88  11-02      Blood Bank: 11-02-17  O  --  Positive      Assessment/Plan:   This is a 53yo right hand dominant male presents with  right frontal parietal SAH. Patient presents to neuro-IR for selective cerebral angiography. Procedure/ risks/ benefits/ goals/ alternatives were explained. Risks include but are not limited to stroke/ vessel injury/ hemorrhage/ groin hematoma. All questions answered. Informed content obtained from patient. Consent placed in chart.     Mohini Nguyen PA-C  [phone]

## 2017-11-03 NOTE — CHART NOTE - NSCHARTNOTEFT_GEN_A_CORE
Interventional Neuro- Radiology   Procedure Note    Procedure: Selective Cerebral Angiography   Pre- Procedure Diagnosis: SAH  Post- Procedure Diagnosis: Normal angiography- no aneurysm/ no malformation     : Dr. Terry MD    Fellow: Dr. Kym Jackson MD     Physician Assistant: Mohini Nguyen PA-C    RN: Gloria     Anesthesia:  Dr. Gui MD (MAC)      Sheath: 4 Fr short sheath    I/Os:  Fluids: 100cc- DTV  Contrast: 137cc  Estimated Blood Loss: <10cc    Vitals: BP= 121/80   HR= 75     SpO2=  100%    Preliminary Report:  Under MAC, using a 4Fr short sheath to the right groin examination of left vertebral artery/ left common carotid artery/ left external carotid artery/ right vertebral artery/ right common carotid artery/ right internal carotid artery/ right external carotid artery via selective cerebral angiography demonstrates normal angiography, no aneurysms, no arterial venous malformation. ( Official note to follow).    Patient tolerated procedure well, vital signs stable, hemodynamically stable, no change in neurological status compared to baseline. Results discussed with neurosurgery/ patient and their family. Groin sheath d/c'ed, manual compression held to hemostasis, no active bleeding, no hematoma, quick clot and safeguard balloon dressing applied at 12:00h. STAT labs, CBC/BMP at 16:00h. Patient transferred to NSCU for further care/ monitoring.    Mohini Nguyen PA-C  [phone]

## 2017-11-03 NOTE — PROGRESS NOTE ADULT - ASSESSMENT
52M with fevers and headache found to have high R frontal SAH without any hx of trauma.  Negative Angio 11/3.    Neurological:   Neuro Checks q1; Pain Control;   maintain euvolemia  PT/OT; OOB tomorrow     CVS:   SBP Goal:   EKG WNL.    Pulm:   Maintain sats > 92%    Renal:   IVF while NPO     GI: NPO, dysphagia screening in 4-6 hrs, advance diet as tolerated   Bowel Regimen  GI PPX:  not indicated    ID: culture if spikes    Heme/Onc:   SCDS; chemoprophylaxis: hold - negative angio, SAH     Endocrine:   FS; ISS    CODE STATUS: Full Code     DISPOSITION: ICU    Patient is at high risk of neurologic deterioration/death due to: CNS hemorrhage     Time spent: 35 critical care minutes 52M with fevers and headache found to have high R frontal SAH without any hx of trauma.  Negative Angio 11/3. CTA no aneurysm, ? cortical venous thrombosis     Neurological:   Neuro Checks q1; Pain Control;   MRI brain and MRV with contrast   maintain euvolemia  PT/OT; OOB tomorrow    CVS:   SBP Goal:   EKG WNL.    Pulm:   Maintain sats > 92%    Renal:   IVF while NPO     GI: NPO, dysphagia screening in 4-6 hrs, advance diet as tolerated   Bowel Regimen  GI PPX:  not indicated    ID: culture if spikes    Heme/Onc:   SCDS; chemoprophylaxis: hold - negative angio, SAH     Endocrine:   FS; ISS    CODE STATUS: Full Code     DISPOSITION: ICU    Patient is at high risk of neurologic deterioration/death due to: CNS hemorrhage     Time spent: 35 critical care minutes

## 2017-11-04 DIAGNOSIS — R50.9 FEVER, UNSPECIFIED: ICD-10-CM

## 2017-11-04 DIAGNOSIS — I60.9 NONTRAUMATIC SUBARACHNOID HEMORRHAGE, UNSPECIFIED: ICD-10-CM

## 2017-11-04 LAB
ANION GAP SERPL CALC-SCNC: 14 MMOL/L — SIGNIFICANT CHANGE UP (ref 5–17)
APTT BLD: 35 SEC — SIGNIFICANT CHANGE UP (ref 27.5–37.4)
BUN SERPL-MCNC: 14 MG/DL — SIGNIFICANT CHANGE UP (ref 7–23)
CALCIUM SERPL-MCNC: 9.3 MG/DL — SIGNIFICANT CHANGE UP (ref 8.4–10.5)
CHLORIDE SERPL-SCNC: 99 MMOL/L — SIGNIFICANT CHANGE UP (ref 96–108)
CO2 SERPL-SCNC: 24 MMOL/L — SIGNIFICANT CHANGE UP (ref 22–31)
CREAT SERPL-MCNC: 1.22 MG/DL — SIGNIFICANT CHANGE UP (ref 0.5–1.3)
GLUCOSE SERPL-MCNC: 112 MG/DL — HIGH (ref 70–99)
HCT VFR BLD CALC: 45.4 % — SIGNIFICANT CHANGE UP (ref 39–50)
HCYS SERPL-MCNC: 7.3 UMOL/L — SIGNIFICANT CHANGE UP (ref 5–15)
HGB BLD-MCNC: 16.1 G/DL — SIGNIFICANT CHANGE UP (ref 13–17)
INR BLD: 1.21 RATIO — HIGH (ref 0.88–1.16)
MAGNESIUM SERPL-MCNC: 2.2 MG/DL — SIGNIFICANT CHANGE UP (ref 1.6–2.6)
MCHC RBC-ENTMCNC: 33.1 PG — SIGNIFICANT CHANGE UP (ref 27–34)
MCHC RBC-ENTMCNC: 35.4 GM/DL — SIGNIFICANT CHANGE UP (ref 32–36)
MCV RBC AUTO: 93.5 FL — SIGNIFICANT CHANGE UP (ref 80–100)
PHOSPHATE SERPL-MCNC: 3 MG/DL — SIGNIFICANT CHANGE UP (ref 2.5–4.5)
PLATELET # BLD AUTO: 321 K/UL — SIGNIFICANT CHANGE UP (ref 150–400)
POTASSIUM SERPL-MCNC: 5.1 MMOL/L — SIGNIFICANT CHANGE UP (ref 3.5–5.3)
POTASSIUM SERPL-SCNC: 5.1 MMOL/L — SIGNIFICANT CHANGE UP (ref 3.5–5.3)
PROTHROM AB SERPL-ACNC: 13.2 SEC — HIGH (ref 9.8–12.7)
PSA FLD-MCNC: 1.42 NG/ML — SIGNIFICANT CHANGE UP (ref 0–4)
RBC # BLD: 4.85 M/UL — SIGNIFICANT CHANGE UP (ref 4.2–5.8)
RBC # FLD: 11.6 % — SIGNIFICANT CHANGE UP (ref 10.3–14.5)
SODIUM SERPL-SCNC: 137 MMOL/L — SIGNIFICANT CHANGE UP (ref 135–145)
WBC # BLD: 11.1 K/UL — HIGH (ref 3.8–10.5)
WBC # FLD AUTO: 11.1 K/UL — HIGH (ref 3.8–10.5)

## 2017-11-04 PROCEDURE — 93306 TTE W/DOPPLER COMPLETE: CPT | Mod: 26

## 2017-11-04 PROCEDURE — 99291 CRITICAL CARE FIRST HOUR: CPT

## 2017-11-04 PROCEDURE — 93970 EXTREMITY STUDY: CPT | Mod: 26

## 2017-11-04 PROCEDURE — 70553 MRI BRAIN STEM W/O & W/DYE: CPT | Mod: 26

## 2017-11-04 PROCEDURE — 93886 INTRACRANIAL COMPLETE STUDY: CPT | Mod: 26

## 2017-11-04 PROCEDURE — G0452: CPT | Mod: 26

## 2017-11-04 PROCEDURE — 70546 MR ANGIOGRAPH HEAD W/O&W/DYE: CPT | Mod: 26,59

## 2017-11-04 RX ORDER — OXYCODONE HYDROCHLORIDE 5 MG/1
5 TABLET ORAL EVERY 6 HOURS
Qty: 0 | Refills: 0 | Status: DISCONTINUED | OUTPATIENT
Start: 2017-11-04 | End: 2017-11-04

## 2017-11-04 RX ORDER — SODIUM CHLORIDE 9 MG/ML
500 INJECTION INTRAMUSCULAR; INTRAVENOUS; SUBCUTANEOUS ONCE
Qty: 0 | Refills: 0 | Status: COMPLETED | OUTPATIENT
Start: 2017-11-04 | End: 2017-11-04

## 2017-11-04 RX ORDER — SODIUM CHLORIDE 9 MG/ML
1000 INJECTION INTRAMUSCULAR; INTRAVENOUS; SUBCUTANEOUS
Qty: 0 | Refills: 0 | Status: DISCONTINUED | OUTPATIENT
Start: 2017-11-04 | End: 2017-11-05

## 2017-11-04 RX ORDER — ACETAMINOPHEN 500 MG
650 TABLET ORAL EVERY 6 HOURS
Qty: 0 | Refills: 0 | Status: DISCONTINUED | OUTPATIENT
Start: 2017-11-04 | End: 2017-11-06

## 2017-11-04 RX ORDER — OXYCODONE HYDROCHLORIDE 5 MG/1
5 TABLET ORAL ONCE
Qty: 0 | Refills: 0 | Status: DISCONTINUED | OUTPATIENT
Start: 2017-11-04 | End: 2017-11-04

## 2017-11-04 RX ORDER — ATORVASTATIN CALCIUM 80 MG/1
10 TABLET, FILM COATED ORAL AT BEDTIME
Qty: 0 | Refills: 0 | Status: DISCONTINUED | OUTPATIENT
Start: 2017-11-04 | End: 2017-11-04

## 2017-11-04 RX ADMIN — Medication 650 MILLIGRAM(S): at 16:55

## 2017-11-04 RX ADMIN — SODIUM CHLORIDE 1 GRAM(S): 9 INJECTION INTRAMUSCULAR; INTRAVENOUS; SUBCUTANEOUS at 05:53

## 2017-11-04 RX ADMIN — SODIUM CHLORIDE 1 GRAM(S): 9 INJECTION INTRAMUSCULAR; INTRAVENOUS; SUBCUTANEOUS at 23:37

## 2017-11-04 RX ADMIN — Medication 100 MILLIGRAM(S): at 05:53

## 2017-11-04 RX ADMIN — Medication 650 MILLIGRAM(S): at 17:25

## 2017-11-04 RX ADMIN — Medication 650 MILLIGRAM(S): at 09:00

## 2017-11-04 RX ADMIN — ENOXAPARIN SODIUM 40 MILLIGRAM(S): 100 INJECTION SUBCUTANEOUS at 18:21

## 2017-11-04 RX ADMIN — SODIUM CHLORIDE 1 GRAM(S): 9 INJECTION INTRAMUSCULAR; INTRAVENOUS; SUBCUTANEOUS at 12:39

## 2017-11-04 RX ADMIN — OXYCODONE HYDROCHLORIDE 5 MILLIGRAM(S): 5 TABLET ORAL at 03:20

## 2017-11-04 RX ADMIN — OXYCODONE HYDROCHLORIDE 5 MILLIGRAM(S): 5 TABLET ORAL at 03:50

## 2017-11-04 RX ADMIN — SODIUM CHLORIDE 500 MILLILITER(S): 9 INJECTION INTRAMUSCULAR; INTRAVENOUS; SUBCUTANEOUS at 09:00

## 2017-11-04 RX ADMIN — Medication 650 MILLIGRAM(S): at 10:00

## 2017-11-04 RX ADMIN — Medication 650 MILLIGRAM(S): at 23:35

## 2017-11-04 RX ADMIN — SODIUM CHLORIDE 1 GRAM(S): 9 INJECTION INTRAMUSCULAR; INTRAVENOUS; SUBCUTANEOUS at 18:21

## 2017-11-04 NOTE — CONSULT NOTE ADULT - ASSESSMENT
Pt w/ ICH no obvious source, stable no obvious neuro deficits at this time continue w/u and management as per neurosurgery and NCU. Fever may be due to ICH.

## 2017-11-04 NOTE — OCCUPATIONAL THERAPY INITIAL EVALUATION ADULT - ADDITIONAL COMMENTS
CT Head 11/3/17: Redemonstration of subarachnoid hemorrhage in the medial right posterior frontal parietal lobe adjacent to the superior sagittal sinus and foci of curvilinear slight increased attenuation a cortical venous thrombosis is not excluded MRI and MRV may be more sensitive assessment.

## 2017-11-04 NOTE — PHYSICAL THERAPY INITIAL EVALUATION ADULT - ADDITIONAL COMMENTS
Pt lives in a third floor condo with elevator; 2-3 steps to enter, 1 handrail.    CT Head 11/3/17: Redemonstration of subarachnoid hemorrhage in the medial right posterior frontal parietal lobe adjacent to the superior sagittal sinus and foci of curvilinear slight increased attenuation a cortical venous thrombosis is not excluded MRI and MRV may be more sensitive assessment.

## 2017-11-04 NOTE — PROGRESS NOTE ADULT - ASSESSMENT
52M with fever headache and spontaneous R parietal SAH, angio negative.   TTE-P  TCDs in am  F/u cultures  MRI/MRV

## 2017-11-04 NOTE — PROGRESS NOTE ADULT - ASSESSMENT
52M with fevers and headache found to have high R frontal SAH without any hx of trauma.  Negative Angio 11/3. CTA no aneurysm, ? cortical venous thrombosis     Neurological:   Neuro Checks q1; Pain Control;   MRI brain and MRV with contrast   maintain euvolemia  PT/OT; OOB tomorrow    CVS:   SBP Goal:   EKG WNL.    Pulm:   Maintain sats > 92%    Renal:   IVF while NPO     GI: advance diet as tolerated   Bowel Regimen  GI PPX:  not indicated    ID: culture if spikes    Heme/Onc:   SCDS; chemoprophylaxis: hold - negative angio, SAH     Endocrine:   FS; ISS    CODE STATUS: Full Code     DISPOSITION: ICU    Patient is at high risk of neurologic deterioration/death due to: CNS hemorrhage     Time spent: 35 critical care minutes 52M with fevers and headache found to have high R frontal SAH without any hx of trauma.  Negative Angio 11/3. CTA no aneurysm, ? cortical venous thrombosis     Neurological:   Neuro Checks q1; Pain Control;   MRI brain and MRV with contrast   maintain euvolemia  PT/OT; OOB tomorrow    CVS:   SBP Goal:   EKG WNL.    Pulm:   Maintain sats > 92%    Renal:   IVF while NPO     GI: advance diet as tolerated   Bowel Regimen  GI PPX:  not indicated    ID: culture if spikes    Heme/Onc:   SCDS; chemoprophylaxis: hold - negative angio, SAH   Hypercoag W/U ; PVera W/U - LUZ 2; repeat INR with mild coagulopathy    Endocrine: hyponatremia - normovolemic hyponatremia=- likely SIADH- once MRI done discuss Free H20 hold  FS; ISS    CODE STATUS: Full Code     DISPOSITION: ICU    Patient is at high risk of neurologic deterioration/death due to: CNS hemorrhage     Time spent: 35 critical care minutes 52M with fevers and headache found to have high R frontal SAH without any hx of trauma.  Negative Angio 11/3. CTA no aneurysm, ? cortical venous thrombosis     Neurological:   Neuro Checks q1; Pain Control;   MRI brain and MRV with contrast   maintain euvolemia  PT/OT; OOB tomorrow    CVS:   SBP Goal:   EKG WNL.    Pulm:   Maintain sats > 92%    Renal:   IVF while NPO     GI: advance diet as tolerated   Bowel Regimen  GI PPX:  not indicated    ID: culture if spikes    Heme/Onc:   SCDS; chemoprophylaxis: - lovenox SQ q day and check Dopplers LE - negative angio, SAH   Hypercoag W/U ; PVera W/U - LUZ 2; repeat INR with mild coagulopathy    Endocrine: hyponatremia - normovolemic hyponatremia=- likely SIADH- once MRI done discuss Free H20 hold  FS; ISS    CODE STATUS: Full Code     DISPOSITION: ICU    Patient is at high risk of neurologic deterioration/death due to: CNS hemorrhage     Time spent: 35 critical care minutes

## 2017-11-04 NOTE — OCCUPATIONAL THERAPY INITIAL EVALUATION ADULT - ANTICIPATED DISCHARGE DISPOSITION, OT EVAL
Patient is functionally independent, no skilled OT intervention is needed. Recommend supervision for functional activities prn./no needs

## 2017-11-04 NOTE — OCCUPATIONAL THERAPY INITIAL EVALUATION ADULT - PERTINENT HX OF CURRENT PROBLEM, REHAB EVAL
52M p/w severe sudden onset headache on 10/26, woke him up out of sleep in the morning with associated nausea, vomiting, malaise. Because of persistent symptoms he came to ED where CT head showed trace high R frontal parietal SAH. He has had associated fevers since headache began, last fever yesterday 101.9. He denies history of valvular disease, IV drug use. Prior to this week he has been feeling healthy although w/ much stress in his personal life. He was previously on ASA81, last dose 10/26.

## 2017-11-04 NOTE — CONSULT NOTE ADULT - SUBJECTIVE AND OBJECTIVE BOX
Chief Complaint/Reason for Admission: SAH	  History of Present Illness: 	  52M with no significant past medical history presents with severe sudden onset headache on 10/26 that woke him up out of sleep in the morning with associated nausea, vomiting, malaise. PT had persistent HA, decreased appetite, and fatigue. Because of persistent symptoms he came to ED for evaluation where CT head showed trace high R frontal parietal SAH. No history of trauma. He has had associated fevers since headache began, last fever yesterday 101.9. He denies history of valvular disease, IV drug use. Prior to this week he has been feeling healthy although with much stress in his personal life. He was previously on ASA81, last dose 10/26.     Review of Systems:  Other Review of Systems: All other review of systems negative, except as noted in HPI	      Allergies and Intolerances:        Allergies:  	No Known Allergies:     Home Medications:   * Outpatient Medication Status not yet specified    .    Patient History:    Past Medical History:  No pertinent past medical history.     Past Surgical History:  No significant past surgical history.     Tobacco Screening:  · Core Measure Site	No	    PE:WD/WN IN NAD  HEENT:PEERLA. EOMI  NECK SUPPLE/FROM  CHEST:CTA  COR:NL S1/S1 NO MURMURS OR RUBS  ABD:NABS SOFT NONTENDER NEG HSM. W/O MASSES, NO REBOUND/GUARDING  EXT:NEG C/CE  NEURO: CN II-XII INTACT, MOTOR +5/5 IN UE/LE  SKIN: NEG RASHES    < from: CT Head No Cont (11.03.17 @ 07:56) >  EXAM:  CT BRAIN                            PROCEDURE DATE:  11/03/2017            INTERPRETATION:  HISTORY: Subarachnoid hemorrhage, sudden onset worst   headache of life, dizzy, nausea, status post TDAP    Technique: Noncontrast CT of the head wasperformed.    Multiple contiguous axial images were acquired from the skull base to the   vertex without the administration of intravenous contrast using portable   technique      COMPARISON: Head CT and CTA dated 11/2/2017    FINDINGS:    There is redemonstration of subarachnoid hemorrhage just lateral to the   right superior sagittal sinus at the vertex in the medial right posterior   frontal and anterior parietal lobe, with a slight area of slight adjacent   area of curvilinear increased attenuation, an underlying small area of   cortical venous thrombosis is not excluded, MRI and MRV may better   delineate this finding. Ventricles and sulci remain unchanged in size   with is no new large extra-axial collection or midline shift, basal   cisterns remain patent.    There is incidental mineralization and/or calcification of the globus   pallidus., There is a partially empty sella.    The calvarium is intact. The visualized intraorbital compartments,   paranasal sinuses and tympanomastoid cavities appear free of acute   disease.    IMPRESSION:    Redemonstration of subarachnoid hemorrhage in the medial right posterior   frontal parietal lobe adjacent to the superior sagittal sinus and foci of   curvilinear slight increased attenuation a cortical venous thrombosis is   not excluded MRI and MRV may be more sensitive assessment. There is no   new large extra-axial collection midline shift, findings discussed with   neuro NICU Dr. Liu at immediate time of review on 11/3/2017 at 9:40PM.                    ATIF RUDD M.D., ATTENDING RADIOLOGIST  This document has been electronically signed. Nov  3 2017  8:40AM    < end of copied text >  < from: CT Angio Neck w/ IV Cont (11.02.17 @ 19:20) >  EXAM:  CT ANGIO NECK (W)AW IC                          EXAM:  CT ANGIO BRAIN (W)AW IC                            PROCEDURE DATE:  11/02/2017            INTERPRETATION:  HISTORY: Sudden onset worst headache of life.    TECHNIQUE: A noncontrast head CT scan was performed. A CT angiogram of   the neck and [place] was performed. A postcontrast head CT was   performed Three-dimensional reformats were obtained. 90 ml of   Omnipaque-350 were administered and 10 ml were discarded.    COMPARISON: CT brain from earlier in the day.    FINDINGS:    NONCONTRAST HEAD CT:    There is redemonstration of hyperdensity involving the superior right   frontal lobe consistent with acute subarachnoid hemorrhage.    The calvarium, skull base and visualized facial bones are intact. The   paranasal sinuses and mastoid cells are well-aerated.    Intracranial CTA:    There is no major vessel occlusion, focal stenosis or aneurysm of the   intracranial circulation. There is no evidence of a vascular   malformation.     There is no abnormal intracranial enhancement.    Neck CTA:    There is a normal configuration to the great vessels as they arise from   the aortic arch. Origins of the great vessels are patent.    The cervical carotid and vertebral arteries enhance bilaterally without   stenoses. There is no focal aneurysmal dilatation. There is no evidence   of carotid or vertebral artery dissection.    There is no evidence of a vascular malformation in the neck.    The visualized soft tissues of the neck have an unremarkable appearance.    The lung apices are clear.    No acute osseous abnormality is identified.    IMPRESSION:       Redemonstration of hyperdensity involving the superior right frontal lobe   consistent with acute subarachnoid hemorrhage.    Patent intracranial vasculature without evidence of major vessel   occlusion or proximal stenosis.     Patent cervical carotid systems.  No hemodynamically significant stenosis   using NASCET criteria.      Patent vertebral arteries.  No evidence of vascular dissection.                  JIGAR GUZMAN M.D., RADIOLOGY RESIDENT  This document has been electronically signed.  NUVIA IRVING   This document has been electronically signed. Nov 2 2017  7:21PM          < end of copied text >

## 2017-11-04 NOTE — OCCUPATIONAL THERAPY INITIAL EVALUATION ADULT - GENERAL OBSERVATIONS, REHAB EVAL
Pt found semi-supine in bed, +cardiac monitor, +BP cuff, +continuous O2 monitor, +B/L venodynes, sister at bedside.

## 2017-11-04 NOTE — PHYSICAL THERAPY INITIAL EVALUATION ADULT - PERTINENT HX OF CURRENT PROBLEM, REHAB EVAL
52M no significant PMHx presents with severe sudden onset headache on 10/26 that woke him up out of sleep in the morning with associated nausea, vomiting, malaise. Due to persistent symptoms he came to ED where CT head showed trace high R frontal parietal SAH. No h/o trauma. Noted associated fevers since headache began, last fever yesterday 101.9. Denies history of valvular disease, IV drug use.

## 2017-11-04 NOTE — CHART NOTE - NSCHARTNOTEFT_GEN_A_CORE
Transcranial doppler exam # 1 (11/4/17) 1030am  mean velocity  cm/sec                              Left         Right  PAO                   58          51  MCA                  74          61  PCA                    25,35     17,39  VERT                  40          36  BA                             54  Full report to follow.  Sen

## 2017-11-04 NOTE — OCCUPATIONAL THERAPY INITIAL EVALUATION ADULT - LIVES WITH, PROFILE
children/Pt lives with his 10 year old son in a condo, 2-3 steps to enter with unilateral handrail with 0 steps to negotiate inside, elevator access. Pt has a walk in shower was was independent with ADLs, IADLs, functional mobility, driving prior to admission.

## 2017-11-05 DIAGNOSIS — R51 HEADACHE: ICD-10-CM

## 2017-11-05 LAB
ANION GAP SERPL CALC-SCNC: 12 MMOL/L — SIGNIFICANT CHANGE UP (ref 5–17)
BUN SERPL-MCNC: 14 MG/DL — SIGNIFICANT CHANGE UP (ref 7–23)
CALCIUM SERPL-MCNC: 9.1 MG/DL — SIGNIFICANT CHANGE UP (ref 8.4–10.5)
CHLORIDE SERPL-SCNC: 99 MMOL/L — SIGNIFICANT CHANGE UP (ref 96–108)
CO2 SERPL-SCNC: 23 MMOL/L — SIGNIFICANT CHANGE UP (ref 22–31)
CREAT SERPL-MCNC: 1.04 MG/DL — SIGNIFICANT CHANGE UP (ref 0.5–1.3)
GLUCOSE SERPL-MCNC: 127 MG/DL — HIGH (ref 70–99)
HCT VFR BLD CALC: 43 % — SIGNIFICANT CHANGE UP (ref 39–50)
HGB BLD-MCNC: 15.8 G/DL — SIGNIFICANT CHANGE UP (ref 13–17)
MAGNESIUM SERPL-MCNC: 2 MG/DL — SIGNIFICANT CHANGE UP (ref 1.6–2.6)
MCHC RBC-ENTMCNC: 34 PG — SIGNIFICANT CHANGE UP (ref 27–34)
MCHC RBC-ENTMCNC: 36.7 GM/DL — HIGH (ref 32–36)
MCV RBC AUTO: 92.6 FL — SIGNIFICANT CHANGE UP (ref 80–100)
PHOSPHATE SERPL-MCNC: 3.2 MG/DL — SIGNIFICANT CHANGE UP (ref 2.5–4.5)
PLATELET # BLD AUTO: 316 K/UL — SIGNIFICANT CHANGE UP (ref 150–400)
POTASSIUM SERPL-MCNC: 4.2 MMOL/L — SIGNIFICANT CHANGE UP (ref 3.5–5.3)
POTASSIUM SERPL-SCNC: 4.2 MMOL/L — SIGNIFICANT CHANGE UP (ref 3.5–5.3)
RBC # BLD: 4.64 M/UL — SIGNIFICANT CHANGE UP (ref 4.2–5.8)
RBC # FLD: 11.5 % — SIGNIFICANT CHANGE UP (ref 10.3–14.5)
SODIUM SERPL-SCNC: 134 MMOL/L — LOW (ref 135–145)
WBC # BLD: 12.2 K/UL — HIGH (ref 3.8–10.5)
WBC # FLD AUTO: 12.2 K/UL — HIGH (ref 3.8–10.5)

## 2017-11-05 PROCEDURE — 99292 CRITICAL CARE ADDL 30 MIN: CPT

## 2017-11-05 PROCEDURE — 99291 CRITICAL CARE FIRST HOUR: CPT

## 2017-11-05 RX ADMIN — Medication 100 MILLIGRAM(S): at 06:00

## 2017-11-05 RX ADMIN — Medication 650 MILLIGRAM(S): at 07:30

## 2017-11-05 RX ADMIN — SODIUM CHLORIDE 1 GRAM(S): 9 INJECTION INTRAMUSCULAR; INTRAVENOUS; SUBCUTANEOUS at 11:21

## 2017-11-05 RX ADMIN — Medication 650 MILLIGRAM(S): at 17:20

## 2017-11-05 RX ADMIN — SODIUM CHLORIDE 1 GRAM(S): 9 INJECTION INTRAMUSCULAR; INTRAVENOUS; SUBCUTANEOUS at 06:00

## 2017-11-05 RX ADMIN — Medication 650 MILLIGRAM(S): at 00:05

## 2017-11-05 RX ADMIN — Medication 650 MILLIGRAM(S): at 16:52

## 2017-11-05 RX ADMIN — SODIUM CHLORIDE 1 GRAM(S): 9 INJECTION INTRAMUSCULAR; INTRAVENOUS; SUBCUTANEOUS at 23:25

## 2017-11-05 RX ADMIN — ENOXAPARIN SODIUM 40 MILLIGRAM(S): 100 INJECTION SUBCUTANEOUS at 17:29

## 2017-11-05 RX ADMIN — SODIUM CHLORIDE 1 GRAM(S): 9 INJECTION INTRAMUSCULAR; INTRAVENOUS; SUBCUTANEOUS at 17:29

## 2017-11-05 NOTE — CONSULT NOTE ADULT - PROBLEM SELECTOR RECOMMENDATION 9
Continue neuro checks.  Will discuss with attending possibility of transfer to stroke neuro service.  Continue ambulation with assistance and PT/OT.

## 2017-11-05 NOTE — CONSULT NOTE ADULT - ATTENDING COMMENTS
VASCULAR NEUROLOGY ATTENDING  The patient is seen and examined the history and imaging are reviewed. I agree with the resident note unless otherwise noted. Etiology unclear. No clear precipitating event. Angio, MRI unrevealing. Will need repeat angio and MRI brain w/wo contrast in 4 weeks. No objection to DC home with pain control.

## 2017-11-05 NOTE — PROGRESS NOTE ADULT - ASSESSMENT
ASSESSMENT/PLAN:  HH1 SAH-angio, MRI, MRV negative. TCDS normal  Hypercoag panel pending; LUZ-2 pending     [x] Patient is at high risk of neurologic deterioration/death due to: SAH, cytotoxic cerebral edema    Time seen: 820p, on 11/5/17  Time spent: _40__ [x] critical care minutes

## 2017-11-05 NOTE — PROGRESS NOTE ADULT - ASSESSMENT
52M with fevers and headache found to have high R frontal SAH without any hx of trauma.  Negative Angio 11/3. CTA no aneurysm, ? cortical venous thrombosis     Neurological:   Neuro Checks q1; Pain Control;   MRI brain and MRV with contrast   maintain euvolemia  PT/OT; OOB tomorrow    CVS:   SBP Goal:   EKG WNL.    Pulm:   Maintain sats > 92%    Renal:   IVF while NPO     GI: advance diet as tolerated   Bowel Regimen  GI PPX:  not indicated    ID: culture if spikes    Heme/Onc:   SCDS; chemoprophylaxis: - lovenox SQ q day and check Dopplers LE - negative angio, SAH   Hypercoag W/U ; PVera W/U - LUZ 2; repeat INR with mild coagulopathy    Endocrine: hyponatremia - normovolemic hyponatremia=- likely SIADH- once MRI done discuss Free H20 hold  FS; ISS    CODE STATUS: Full Code     DISPOSITION: ICU    Patient is at high risk of neurologic deterioration/death due to: CNS hemorrhage     Time spent: 35 critical care minutes 52M with fevers and headache found to have high R frontal SAH without any hx of trauma.  Negative Angio 11/3. CTA no aneurysm, ? cortical venous thrombosis ; MRV - patent vessels    Neurological:   Neuro Checks q4; Pain Control;   MRI brain and MRV with contrast - completed  maintain euvolemia- IVL   PT/OT; OOB today     CVS:   SBP Goal:   EKG WNL.    Pulm:   Maintain sats > 92%    Renal:   IVL      GI: advance diet as tolerated   Bowel Regimen- refused colace /senna  GI not indicated    ID: culture if spikes    Heme/Onc:   SCDS; chemoprophylaxis: - lovenox SQ q day and Dopplers LE - neg - negative angio, SAH   Hypercoag W/U ; PVera W/U - LUZ 2; repeat INR with mild coagulopathy    Endocrine: hyponatremia - normovolemic hyponatremia=- likely SIADH- IVL   Goal Na- 135- 145  FS; ISS    CODE STATUS: Full Code     DISPOSITION: FLR / stroke service     Patient is at high risk of neurologic deterioration/death due to: CNS hemorrhage     Time spent: 35 critical care minutes

## 2017-11-05 NOTE — CONSULT NOTE ADULT - ASSESSMENT
52 year old healthy male with over 1 week of worst headache of life found to have superior R frontal SAH without any significant neurologic deficits and normal vasculature with persistent headaches.

## 2017-11-05 NOTE — PROGRESS NOTE ADULT - ASSESSMENT
Pt stable still with HAs and occassional nausea most likely due to ICH. Pt with ? venous thrombosis as possible etiology and w/u for possible P vera. Pt with increased H/H but nl plts and wbc, Increased H/H could also be due to obstructive sleep apnea.

## 2017-11-05 NOTE — CONSULT NOTE ADULT - PROBLEM SELECTOR RECOMMENDATION 2
Magnesium IV 2gm x1 dose.   Can give 1 dose of low-dose depakote if still experiencing headache, but monitor platelets.   Can also give steroids.  Avoid narcotics and overuse of tylenol.

## 2017-11-05 NOTE — CONSULT NOTE ADULT - SUBJECTIVE AND OBJECTIVE BOX
Neurology Consult    Name  BRYNN GARCIA    52 year old male with no past medical history presented to the ED with almost 1 week of the worse headache of his life beginning last Friday. Patient stated he woke up with the headache that was accompanied by neck pain and went about his daily business but noticed that he was nauseas and having trouble keeping food down s/t vomiting. Later in the week, when he was unable to even get out of bed because of the pain, he told his doctor who recommended he go to the ED where they found a high right frontal SAH on CT scan. Patient denies any trauma.   Since admitted, he has had a hard time controlling his pain stating that the opiate pain medications make him feel ill and that he is taking very high amounts of tylenol already.   He denies any weakness but did feel that he had some very slight numbness in the left hand and foot on the day of admission. He had a conventional angiogram with Dr. Stewart that was negative for a source of the hemorrhage.  Oneal and Dawn Score on admission was 2  NIHSS 0, MRS 0                                                          MEDICATIONS  (STANDING):  docusate sodium 100 milliGRAM(s) Oral two times a day  enoxaparin Injectable 40 milliGRAM(s) SubCutaneous <User Schedule>  senna 1 Tablet(s) Oral daily  sodium chloride 1 Gram(s) Oral every 6 hours    MEDICATIONS  (PRN):  acetaminophen    Suspension. 650 milliGRAM(s) Oral every 6 hours PRN Moderate Pain (4 - 6)      Allergies    No Known Allergies    Intolerances        Objective  Vital Signs Last 24 Hrs  T(C): 37 (05 Nov 2017 15:00), Max: 37.3 (05 Nov 2017 06:00)  T(F): 98.6 (05 Nov 2017 15:00), Max: 99.1 (05 Nov 2017 06:00)  HR: 79 (05 Nov 2017 15:00) (61 - 87)  BP: 137/87 (05 Nov 2017 15:00) (119/82 - 140/90)  BP(mean): 101 (05 Nov 2017 15:00) (89 - 106)  RR: 13 (05 Nov 2017 15:00) (13 - 20)  SpO2: 100% (05 Nov 2017 15:00) (97% - 100%)    General Exam   General appearance: No acute distress, well-nourished  Respiratory:    non-labored respirations               Neurological Exam  Mental Status:  alert and oriented x3, fluent speech, following commands, repetition and naming intact    Cranial Nerves: PERRL, EOMI without nystagmus, visual fields intact, no facial droop, no dysarthria    Motor:   Tone:   normal               Strength:  Upper extremity                          Delt       Bicep    Tricep                                                  R         5/5        5/5        5/5       5/5                                               L          5/5        5/5        5/5      5/5    Lower extremity                           HF          KE          KF        DF         PF                                               R        5/5        5/5        5/5       5/5       5/5                                               L         5/5        5/5        5/5      5/5        5/5    Pronator drift:   none           Dysmetria: none with finger-to-nose testing  Tremor:  none appreciated at rest or in action    Sensation: intact grossly to light touch      Other Studies    11-04    137  |  99  |  14  ----------------------------<  112<H>  5.1   |  24  |  1.22    Ca    9.3      04 Nov 2017 20:51  Phos  3.0     11-04  Mg     2.2     11-04 11-04    137  |  99  |  14  ----------------------------<  112<H>  5.1   |  24  |  1.22    Ca    9.3      04 Nov 2017 20:51  Phos  3.0     11-04  Mg     2.2     11-04          Radiology    CTh:  < from: CT Head No Cont (11.03.17 @ 07:56) >  COMPARISON: Head CT and CTA dated 11/2/2017    FINDINGS:    There is redemonstration of subarachnoid hemorrhage just lateral to the   right superior sagittal sinus at the vertex in the medial right posterior   frontal and anterior parietal lobe, with a slight area of slight adjacent   area of curvilinear increased attenuation, an underlying small area of   cortical venous thrombosis is not excluded, MRI and MRV may better   delineate this finding. Ventricles and sulci remain unchanged in size   with is no new large extra-axial collection or midline shift, basal   cisterns remain patent.    There is incidental mineralization and/or calcification of the globus   pallidus., There is a partially empty sella.    < end of copied text >    < from: MRI Head w/wo Cont (11.04.17 @ 16:46) >  MRI brain: Redemonstration of a tiny amount of subarachnoid hemorrhage   within the high right postcentral sulcus.    Nonspecific white matter changes.    No abnormal brain parenchymal or leptomeningeal enhancement.    MRV head: No evidence of venous sinus thrombosis.      < end of copied text >

## 2017-11-06 VITALS
DIASTOLIC BLOOD PRESSURE: 89 MMHG | OXYGEN SATURATION: 96 % | HEART RATE: 72 BPM | SYSTOLIC BLOOD PRESSURE: 135 MMHG | RESPIRATION RATE: 18 BRPM | TEMPERATURE: 99 F

## 2017-11-06 LAB
ANION GAP SERPL CALC-SCNC: 13 MMOL/L — SIGNIFICANT CHANGE UP (ref 5–17)
AT III ACT/NOR PPP CHRO: 136 % — HIGH (ref 85–135)
BUN SERPL-MCNC: 12 MG/DL — SIGNIFICANT CHANGE UP (ref 7–23)
CALCIUM SERPL-MCNC: 9.7 MG/DL — SIGNIFICANT CHANGE UP (ref 8.4–10.5)
CHLORIDE SERPL-SCNC: 98 MMOL/L — SIGNIFICANT CHANGE UP (ref 96–108)
CO2 SERPL-SCNC: 24 MMOL/L — SIGNIFICANT CHANGE UP (ref 22–31)
CREAT SERPL-MCNC: 1.18 MG/DL — SIGNIFICANT CHANGE UP (ref 0.5–1.3)
DRVVT SCREEN TO CONFIRM RATIO: SIGNIFICANT CHANGE UP
GLUCOSE SERPL-MCNC: 105 MG/DL — HIGH (ref 70–99)
LA NT DPL PPP QL: 41.8 SEC — SIGNIFICANT CHANGE UP
POTASSIUM SERPL-MCNC: 4.6 MMOL/L — SIGNIFICANT CHANGE UP (ref 3.5–5.3)
POTASSIUM SERPL-SCNC: 4.6 MMOL/L — SIGNIFICANT CHANGE UP (ref 3.5–5.3)
PROT C ACT/NOR PPP: 142 % — SIGNIFICANT CHANGE UP (ref 74–150)
PROT S FREE AG PPP IA-ACNC: 124 % — SIGNIFICANT CHANGE UP (ref 67–141)
SODIUM SERPL-SCNC: 135 MMOL/L — SIGNIFICANT CHANGE UP (ref 135–145)

## 2017-11-06 PROCEDURE — 84100 ASSAY OF PHOSPHORUS: CPT

## 2017-11-06 PROCEDURE — 85307 ASSAY ACTIVATED PROTEIN C: CPT

## 2017-11-06 PROCEDURE — C1769: CPT

## 2017-11-06 PROCEDURE — 83090 ASSAY OF HOMOCYSTEINE: CPT

## 2017-11-06 PROCEDURE — 36226 PLACE CATH VERTEBRAL ART: CPT

## 2017-11-06 PROCEDURE — 83605 ASSAY OF LACTIC ACID: CPT

## 2017-11-06 PROCEDURE — 83735 ASSAY OF MAGNESIUM: CPT

## 2017-11-06 PROCEDURE — 85027 COMPLETE CBC AUTOMATED: CPT

## 2017-11-06 PROCEDURE — 99233 SBSQ HOSP IP/OBS HIGH 50: CPT

## 2017-11-06 PROCEDURE — 36224 PLACE CATH CAROTD ART: CPT

## 2017-11-06 PROCEDURE — 96374 THER/PROPH/DIAG INJ IV PUSH: CPT | Mod: XU

## 2017-11-06 PROCEDURE — G0103: CPT

## 2017-11-06 PROCEDURE — 70450 CT HEAD/BRAIN W/O DYE: CPT

## 2017-11-06 PROCEDURE — 99285 EMERGENCY DEPT VISIT HI MDM: CPT | Mod: 25

## 2017-11-06 PROCEDURE — 81003 URINALYSIS AUTO W/O SCOPE: CPT

## 2017-11-06 PROCEDURE — 84443 ASSAY THYROID STIM HORMONE: CPT

## 2017-11-06 PROCEDURE — 85300 ANTITHROMBIN III ACTIVITY: CPT

## 2017-11-06 PROCEDURE — 85652 RBC SED RATE AUTOMATED: CPT

## 2017-11-06 PROCEDURE — 87798 DETECT AGENT NOS DNA AMP: CPT

## 2017-11-06 PROCEDURE — 86901 BLOOD TYPING SEROLOGIC RH(D): CPT

## 2017-11-06 PROCEDURE — 87486 CHLMYD PNEUM DNA AMP PROBE: CPT

## 2017-11-06 PROCEDURE — 84132 ASSAY OF SERUM POTASSIUM: CPT

## 2017-11-06 PROCEDURE — 70498 CT ANGIOGRAPHY NECK: CPT

## 2017-11-06 PROCEDURE — 86900 BLOOD TYPING SEROLOGIC ABO: CPT

## 2017-11-06 PROCEDURE — 97161 PT EVAL LOW COMPLEX 20 MIN: CPT

## 2017-11-06 PROCEDURE — 86140 C-REACTIVE PROTEIN: CPT

## 2017-11-06 PROCEDURE — 83036 HEMOGLOBIN GLYCOSYLATED A1C: CPT

## 2017-11-06 PROCEDURE — 87040 BLOOD CULTURE FOR BACTERIA: CPT

## 2017-11-06 PROCEDURE — 82947 ASSAY GLUCOSE BLOOD QUANT: CPT

## 2017-11-06 PROCEDURE — 84295 ASSAY OF SERUM SODIUM: CPT

## 2017-11-06 PROCEDURE — 93970 EXTREMITY STUDY: CPT

## 2017-11-06 PROCEDURE — 86147 CARDIOLIPIN ANTIBODY EA IG: CPT

## 2017-11-06 PROCEDURE — C8929: CPT

## 2017-11-06 PROCEDURE — C1887: CPT

## 2017-11-06 PROCEDURE — 86850 RBC ANTIBODY SCREEN: CPT

## 2017-11-06 PROCEDURE — 36227 PLACE CATH XTRNL CAROTID: CPT

## 2017-11-06 PROCEDURE — 86146 BETA-2 GLYCOPROTEIN ANTIBODY: CPT

## 2017-11-06 PROCEDURE — 82435 ASSAY OF BLOOD CHLORIDE: CPT

## 2017-11-06 PROCEDURE — 93005 ELECTROCARDIOGRAM TRACING: CPT

## 2017-11-06 PROCEDURE — 80307 DRUG TEST PRSMV CHEM ANLYZR: CPT

## 2017-11-06 PROCEDURE — 97165 OT EVAL LOW COMPLEX 30 MIN: CPT

## 2017-11-06 PROCEDURE — 71045 X-RAY EXAM CHEST 1 VIEW: CPT

## 2017-11-06 PROCEDURE — 80048 BASIC METABOLIC PNL TOTAL CA: CPT

## 2017-11-06 PROCEDURE — 87633 RESP VIRUS 12-25 TARGETS: CPT

## 2017-11-06 PROCEDURE — 70496 CT ANGIOGRAPHY HEAD: CPT

## 2017-11-06 PROCEDURE — 82803 BLOOD GASES ANY COMBINATION: CPT

## 2017-11-06 PROCEDURE — 85303 CLOT INHIBIT PROT C ACTIVITY: CPT

## 2017-11-06 PROCEDURE — 70553 MRI BRAIN STEM W/O & W/DYE: CPT

## 2017-11-06 PROCEDURE — 70546 MR ANGIOGRAPH HEAD W/O&W/DYE: CPT

## 2017-11-06 PROCEDURE — 93886 INTRACRANIAL COMPLETE STUDY: CPT

## 2017-11-06 PROCEDURE — 80053 COMPREHEN METABOLIC PANEL: CPT

## 2017-11-06 PROCEDURE — C1894: CPT

## 2017-11-06 PROCEDURE — 87581 M.PNEUMON DNA AMP PROBE: CPT

## 2017-11-06 PROCEDURE — 85730 THROMBOPLASTIN TIME PARTIAL: CPT

## 2017-11-06 PROCEDURE — 85306 CLOT INHIBIT PROT S FREE: CPT

## 2017-11-06 PROCEDURE — 85610 PROTHROMBIN TIME: CPT

## 2017-11-06 PROCEDURE — 87086 URINE CULTURE/COLONY COUNT: CPT

## 2017-11-06 PROCEDURE — 85613 RUSSELL VIPER VENOM DILUTED: CPT

## 2017-11-06 PROCEDURE — 82668 ASSAY OF ERYTHROPOIETIN: CPT

## 2017-11-06 PROCEDURE — 81270 JAK2 GENE: CPT

## 2017-11-06 PROCEDURE — 36223 PLACE CATH CAROTID/INOM ART: CPT | Mod: 59

## 2017-11-06 PROCEDURE — 85014 HEMATOCRIT: CPT

## 2017-11-06 PROCEDURE — 96375 TX/PRO/DX INJ NEW DRUG ADDON: CPT | Mod: XU

## 2017-11-06 PROCEDURE — 82330 ASSAY OF CALCIUM: CPT

## 2017-11-06 PROCEDURE — 80061 LIPID PANEL: CPT

## 2017-11-06 RX ORDER — DOCUSATE SODIUM 100 MG
1 CAPSULE ORAL
Qty: 0 | Refills: 0 | COMMUNITY
Start: 2017-11-06

## 2017-11-06 RX ORDER — SENNA PLUS 8.6 MG/1
1 TABLET ORAL
Qty: 0 | Refills: 0 | COMMUNITY
Start: 2017-11-06

## 2017-11-06 RX ADMIN — SODIUM CHLORIDE 1 GRAM(S): 9 INJECTION INTRAMUSCULAR; INTRAVENOUS; SUBCUTANEOUS at 06:02

## 2017-11-06 RX ADMIN — Medication 650 MILLIGRAM(S): at 01:51

## 2017-11-06 RX ADMIN — Medication 650 MILLIGRAM(S): at 08:30

## 2017-11-06 RX ADMIN — Medication 650 MILLIGRAM(S): at 01:21

## 2017-11-06 RX ADMIN — Medication 650 MILLIGRAM(S): at 08:00

## 2017-11-06 NOTE — PROGRESS NOTE ADULT - PROBLEM SELECTOR PLAN 1
CONTINUE MANAGEMENT AS PER NEURO  AWAIT RESULTS OF LUZ-2  AND ANTICOAG W/U  AMBULATE AS TOLERATED
continue management as per neuro and neurosurg  continue w/ for ? hypercoaguable state if pt has thrombosis.

## 2017-11-06 NOTE — PROGRESS NOTE ADULT - ASSESSMENT
ASSESSMENT/PLAN:  HH1 SAH-angio, MRI, MRV negative. TCDS normal  Hypercoag panel pending; LUZ-2 pending  S/P hyponatremia- SIADH -   F/U with Vascular in 3-5 days repeat Na  Discharge planning discussed     [x] Patient is at high risk of neurologic deterioration/death due to: SAH, cytotoxic cerebral edema    Time seen: 800am, on 11/6/17  Time spent: _30__ minutes

## 2017-11-06 NOTE — PROGRESS NOTE ADULT - SUBJECTIVE AND OBJECTIVE BOX
HPI:  52M with no significant past medical history presents with severe sudden onset headache on 10/26 that woke him up out of sleep in the morning with associated nausea, vomiting, malaise. Because of persistent symptoms he came to ED where CT head showed trace high R frontal parietal SAH. No history of trauma. He has had associated fevers since headache began, last fever yesterday 101.9. He denies history of valvular disease, IV drug use. Prior to this week he has been feeling healthy although with much stress in his personal life. He was previously on ASA81, last dose 10/26. (02 Nov 2017 17:40)    Overnight Events: underwent angio 11/3, which was negative.    ROS: negative [] unable to obtain as patient is comatose/intubated/aphasic []   VITALS:   T(C): 37 (11-03-17 @ 12:15), Max: 37.8 (11-02-17 @ 16:45)  HR: 68 (11-03-17 @ 13:00) (61 - 91)  BP: 129/87 (11-03-17 @ 13:00) (117/79 - 159/90)  RR: 7 (11-03-17 @ 13:00) (7 - 26)  SpO2: 100% (11-03-17 @ 13:00) (96% - 100%)    11-02-17 @ 07:01  -  11-03-17 @ 07:00  --------------------------------------------------------  IN: 825 mL / OUT: 2050 mL / NET: -1225 mL    11-03-17 @ 07:01  -  11-03-17 @ 13:21  --------------------------------------------------------  IN: 325 mL / OUT: 0 mL / NET: 325 mL      LABS:                          16.4   12.9  )-----------( 304      ( 02 Nov 2017 22:02 )             46.3     11-02    133<L>  |  96  |  11  ----------------------------<  101<H>  4.5   |  24  |  1.06    Ca    9.1      02 Nov 2017 22:02  Phos  2.9     11-02  Mg     2.1     11-02    TPro  8.1  /  Alb  4.4  /  TBili  1.2  /  DBili  x   /  AST  27  /  ALT  31  /  AlkPhos  88  11-02    PT/INR - ( 02 Nov 2017 17:07 )   PT: 13.1 sec;   INR: 1.20 ratio         PTT - ( 02 Nov 2017 17:07 )  PTT:35.0 sec  MEDS:  MEDICATIONS  (STANDING):  docusate sodium 100 milliGRAM(s) Oral two times a day  enoxaparin Injectable 40 milliGRAM(s) SubCutaneous <User Schedule>  senna 1 Tablet(s) Oral daily  sodium chloride 1 Gram(s) Oral every 6 hours  sodium chloride 0.9% with potassium chloride 20 mEq/L 1000 milliLiter(s) (75 mL/Hr) IV Continuous <Continuous>      [All pertinent recent Imaging/Reports reviewed]    DEVICES: [] Restraints [] DAVEY/HMV []LD [] ET tube [] Trach [] A-line [] Whitfield [] NGT [] Rectal Tube       General: well appearing no acute distress  Neurological: alert, oriented x3, language intact, perrl, eomi, face=, tup midline, 5/5 throughout, sensation intact to light touch, no dysmetria.   CVS: rrr  Pulm: ctabl  Abd: soft/nt/nd  Ext: warm, well perfused  Skin: no rashes
HPI:  52M with no significant past medical history presents with severe sudden onset headache on 10/26 that woke him up out of sleep in the morning with associated nausea, vomiting, malaise. Because of persistent symptoms he came to ED where CT head showed trace high R frontal parietal SAH. No history of trauma. He has had associated fevers since headache began, last fever yesterday 101.9. He denies history of valvular disease, IV drug use. Prior to this week he has been feeling healthy although with much stress in his personal life. He was previously on ASA81, last dose 10/26. (02 Nov 2017 17:40)  Underwent angio 11/3, which was negative.    Overnight Events: none reported.    ROS: negative [x] unable to obtain as patient is comatose/intubated/aphasic []   VITALS:   T(C): 36.8 (11-04-17 @ 03:00), Max: 37.2 (11-03-17 @ 19:00)  HR: 67 (11-04-17 @ 06:00) (61 - 86)  BP: 125/82 (11-04-17 @ 06:00) (111/77 - 139/82)  RR: 11 (11-04-17 @ 06:00) (7 - 26)  SpO2: 99% (11-04-17 @ 06:00) (96% - 100%)    11-02-17 @ 07:01  -  11-03-17 @ 07:00  --------------------------------------------------------  IN: 825 mL / OUT: 2050 mL / NET: -1225 mL    11-03-17 @ 07:01  -  11-04-17 @ 06:57  --------------------------------------------------------  IN: 1516.6 mL / OUT: 2200 mL / NET: -683.4 mL      LABS:                          16.2   12.4  )-----------( 315      ( 03 Nov 2017 20:31 )             45.7     11-03    137  |  98  |  14  ----------------------------<  133<H>  5.0   |  27  |  1.23    Ca    9.5      03 Nov 2017 20:31  Phos  3.1     11-03  Mg     2.3     11-03    TPro  8.1  /  Alb  4.4  /  TBili  1.2  /  DBili  x   /  AST  27  /  ALT  31  /  AlkPhos  88  11-02    PT/INR - ( 02 Nov 2017 17:07 )   PT: 13.1 sec;   INR: 1.20 ratio         PTT - ( 02 Nov 2017 17:07 )  PTT:35.0 sec  MEDS:  MEDICATIONS  (STANDING):  docusate sodium 100 milliGRAM(s) Oral two times a day  enoxaparin Injectable 40 milliGRAM(s) SubCutaneous <User Schedule>  senna 1 Tablet(s) Oral daily  sodium chloride 1 Gram(s) Oral every 6 hours      [All pertinent recent Imaging/Reports reviewed]    DEVICES: [] Restraints [] DAVEY/HMV []LD [] ET tube [] Trach [] A-line [] Whitfield [] NGT [] Rectal Tube       General: well appearing no acute distress  Neurological: alert, oriented x3, language intact, perrl, eomi, face=, tup midline, 5/5 throughout, sensation intact to light touch, no dysmetria.   CVS: rrr  Pulm: ctabl  Abd: soft/nt/nd  Ext: warm, well perfused  Skin: no rashes
Patient Seen and Examined.     Overnight Events:   None    T(C): 37.2 (11-03-17 @ 23:00), Max: 37.6 (11-03-17 @ 03:00)  HR: 70 (11-03-17 @ 23:00) (64 - 86)  BP: 115/82 (11-03-17 @ 23:00) (115/82 - 139/82)  RR: 15 (11-03-17 @ 23:00) (7 - 26)  SpO2: 98% (11-03-17 @ 23:00) (96% - 100%)    Exam:   Awake, Alert, AOX3  PERRL, EOMI, Face equal, Tongue m/l  5/5 throughout, no drift  SILT    docusate sodium 100 milliGRAM(s) Oral two times a day  enoxaparin Injectable 40 milliGRAM(s) SubCutaneous <User Schedule>  oxyCODONE    5 mG/acetaminophen 325 mG 1 Tablet(s) Oral every 6 hours PRN  oxyCODONE    5 mG/acetaminophen 325 mG 2 Tablet(s) Oral every 6 hours PRN  senna 1 Tablet(s) Oral daily  sodium chloride 1 Gram(s) Oral every 6 hours                            16.2   12.4  )-----------( 315      ( 03 Nov 2017 20:31 )             45.7     11-03    137  |  98  |  14  ----------------------------<  133<H>  5.0   |  27  |  1.23    Ca    9.5      03 Nov 2017 20:31  Phos  3.1     11-03  Mg     2.3     11-03    TPro  8.1  /  Alb  4.4  /  TBili  1.2  /  DBili  x   /  AST  27  /  ALT  31  /  AlkPhos  88  11-02    PT/INR - ( 02 Nov 2017 17:07 )   PT: 13.1 sec;   INR: 1.20 ratio         PTT - ( 02 Nov 2017 17:07 )  PTT:35.0 sec    Imaging:   Angio: Negative
Patient is a 52y old  Male who presents with a chief complaint of SAH (02 Nov 2017 21:06)      INTERVAL HPI/OVERNIGHT EVENTS:    Pt still c/o nausea and HA, no visual changes or other neuro deficits  T(C): 37 (11-05-17 @ 15:00), Max: 37.3 (11-05-17 @ 06:00)  HR: 79 (11-05-17 @ 15:00) (61 - 87)  BP: 137/87 (11-05-17 @ 15:00) (119/82 - 140/90)  RR: 13 (11-05-17 @ 15:00) (13 - 20)  SpO2: 100% (11-05-17 @ 15:00) (97% - 100%)  Wt(kg): --  I&O's Summary    04 Nov 2017 08:01  -  05 Nov 2017 07:00  --------------------------------------------------------  IN: 1960 mL / OUT: 4000 mL / NET: -2040 mL    05 Nov 2017 07:01  -  05 Nov 2017 18:46  --------------------------------------------------------  IN: 1140 mL / OUT: 1450 mL / NET: -310 mL        LABS:                        16.1   11.1  )-----------( 321      ( 04 Nov 2017 20:51 )             45.4     11-04    137  |  99  |  14  ----------------------------<  112<H>  5.1   |  24  |  1.22    Ca    9.3      04 Nov 2017 20:51  Phos  3.0     11-04  Mg     2.2     11-04      PT/INR - ( 04 Nov 2017 13:20 )   PT: 13.2 sec;   INR: 1.21 ratio         PTT - ( 04 Nov 2017 13:20 )  PTT:35.0 sec      REVIEW OF SYSTEMS:  CONSTITUTIONAL: No fever, weight loss, or fatigue  EYES: No eye pain, visual disturbances, or discharge  NECK: No pain or stiffness  RESPIRATORY: No cough, wheezing, chills or hemoptysis; No shortness of breath  CARDIOVASCULAR: No chest pain, palpitations, dizziness, or leg swelling  GASTROINTESTINAL:  nausea no vomiting  GENITOURINARY: No dysuria, frequency, hematuria, or incontinence  NEUROLOGICAL:  headaches, no memory loss, loss of strength, numbness, or tremors  SKIN: No itching, burning, rashes, or lesions   LYMPH NODES: No enlarged glands  ENDOCRINE: No heat or cold intolerance; No hair loss  MUSCULOSKELETAL: No joint pain or swelling; No muscle, back, or extremity pain  PSYCHIATRIC: No depression, anxiety, mood swings, or difficulty sleeping  HEME/LYMPH: No easy bruising, or bleeding gums  ALLERY AND IMMUNOLOGIC: No hives or eczema    RADIOLOGY & ADDITIONAL TESTS:    < from: MRA/MRV Head w/wo Cont Addl (11.04.17 @ 17:58) >  EXAM:  MRA MRV HEAD W-W O C, ADD'L                          EXAM:  MRI BRAIN WO W CONTRAST                            PROCEDURE DATE:  11/04/2017            INTERPRETATION:  .    CLINICAL INFORMATION: Headache and dizziness. Nausea. Subarachnoid   hemorrhage. Evaluate for venous sinus thrombosis.    TECHNIQUE: Multiplanar multi sequential MRI examination of the brain was   performed without the administration of IV gadolinium. Phase contrast MR   venography of the head was performed. The data was then reformatted into   a volumetric data set and reviewed as rotational MIP images. 8 cc's of IV   Gadavist was administered without immediate complication. 2 cc's was   discarded.    COMPARISON: There are no comparison MRIs. Most recent prior CT   examination of the head from 11/3/2017. Prior cerebral angiography   examination from 11/3/2017. Most remote CT examination of the head from   11/2/2017.    FINDINGS:     MRI Brain: A tiny amount of subarachnoid hemorrhage within the high   medial postcentral sulcus is again noted.    A few nonspecific foci of T2/FLAIR hyperintensity are noted throughout   the deep and periventricular white matter. There is no associated mass   effect. There is no evidence of acute ischemia on the diffusion-weighted   images. There is no abnormal brain parenchymal or leptomeningeal   enhancement.    Ventricle size and configuration is unremarkable. Flow-voids are noted   throughout the major intracranial vessels, on the T2 weighted images,   consistent with their patency. The sella turcica and posterior fossa are   unremarkable.    The paranasal sinuses and mastoid air cells are clear. Calvarial signal   is within normal limits. The orbits appear unremarkable.    MRV head: The superior, inferior, confluence of, straight, bilateral   transverse and sigmoid sinuses are patent. The right transverse and   sigmoid sinuses are dominant compared to the left.    The bilateral internal cerebral veins, vein of Mario, and basal veins of   Rosina are patent as well as the internal jugular veins.    There is no evidence of cavernous sinus thrombosis.    IMPRESSION:    MRI brain: Redemonstration of a tiny amount of subarachnoid hemorrhage   within the high right postcentral sulcus.    Nonspecific white matter changes.    No abnormal brain parenchymal or leptomeningeal enhancement.    MRV head: No evidence of venous sinus thrombosis.                      NAHID LONGORIA M.D., ATTENDING RADIOLOGIST  This document has been electronically signed. Nov 5 2017  8:42AM              Consultant(s) Notes Reviewed:  [x ] YES  [ ] NO    PHYSICAL EXAM:  GENERAL: NAD, well-groomed, well-developed  HEAD:  Atraumatic, Normocephalic  EYES: conjunctiva and sclera clear  NERVOUS SYSTEM:  Alert & Oriented X3, Good concentration; Motor Strength 5/5 B/L upper and lower extremities  CHEST/LUNG: Clear to auscultation bilaterally; No rales, rhonchi, wheezing, or rubs  HEART: Regular rate and rhythm; No murmurs, rubs, or gallops  ABDOMEN: Soft, Nontender, Nondistended; Bowel sounds present, neg HSM  EXTREMITIES:   No clubbing, cyanosis, or edema  LYMPH: No lymphadenopathy noted  SKIN: No rashes or lesions
HPI:  52M with no significant past medical history presents with severe sudden onset headache on 10/26 that woke him up out of sleep in the morning with associated nausea, vomiting, malaise. Because of persistent symptoms he came to ED where CT head showed trace high R frontal parietal SAH. No history of trauma. He has had associated fevers since headache began, last fever yesterday 101.9. He denies history of valvular disease, IV drug use. Prior to this week he has been feeling healthy although with much stress in his personal life. He was previously on ASA81, last dose 10/26. (02 Nov 2017 17:40)      SURGERY:   PAST MEDICAL HISTORY: No pertinent past medical history    PAST SURGICAL HISTORY: No significant past surgical history    FAMILY HISTORY:    ALLERGIES: No Known Allergies    OVERNIGHT EVENTS:   ROS: negative [] unable to obtain as patient is intubated/aphasic/comatose []    VITALS:  ICU Vital Signs Last 24 Hrs  T(C): 37.8 (02 Nov 2017 16:45), Max: 37.8 (02 Nov 2017 16:45)  T(F): 100.1 (02 Nov 2017 16:45), Max: 100.1 (02 Nov 2017 16:45)  HR: 84 (02 Nov 2017 16:45) (83 - 84)  BP: 159/90 (02 Nov 2017 16:45) (150/94 - 159/90)  BP(mean): --  ABP: --  ABP(mean): --  RR: 18 (02 Nov 2017 16:45) (18 - 18)  SpO2: 98% (02 Nov 2017 16:45) (98% - 100%)      LABS:                        16.6   12.3  )-----------( 330      ( 02 Nov 2017 17:07 )             48.1    11-02    132<L>  |  94<L>  |  12  ----------------------------<  108<H>  4.5   |  24  |  1.25    Ca    9.7      02 Nov 2017 17:07    TPro  8.1  /  Alb  4.4  /  TBili  1.2  /  DBili  x   /  AST  27  /  ALT  31  /  AlkPhos  88  11-02        Imaging: all pertinent imaging reviewed [x]    MEDICATIONS  (STANDING):  levETIRAcetam  IVPB 1000 milliGRAM(s) IV Intermittent every 12 hours  niCARdipine Infusion 3 mG/Hr (15 mL/Hr) IV Continuous <Continuous>  niMODipine 60 milliGRAM(s) Oral every 4 hours  sodium chloride 0.9% with potassium chloride 20 mEq/L 1000 milliLiter(s) (75 mL/Hr) IV Continuous <Continuous>    DEVICES: [] Restraints [] DAVEY/HMV []LD [] ET tube [] Trach [] Chest Tube [] A-line [] Whitfield [] NGT [] Rectal Tube     General: well apprearing, no acute distress  Neurological: alert, oriented x3, language intact, perrl, eomi, face=, tup midline, 5/5 throughout, sensation intact to light touch, no dysmetria.   CVS: rrr  Pulm: ctabl  Abd: soft/nt/nd  Ext: warm, well perfused  Skin: no rashes
No events overnight.     Patient pleasant without complaints this am.     AAOX3  FC  MELVIN DIETZ    52M with fevers and headache found to have high R frontal SAH without any hx of trauma.  Negative Angio 11/3.   MRI/MRV pending  TCDS in am  f/u hypercoagulable workup    PT/OT; OOB tomorrow
Patient Seen and Examined.     Overnight Events: None    T(C): 37.1 (11-05-17 @ 23:00), Max: 37.3 (11-05-17 @ 06:00)  HR: 75 (11-06-17 @ 01:00) (61 - 106)  BP: 131/91 (11-06-17 @ 01:00) (129/89 - 137/87)  RR: 17 (11-06-17 @ 01:00) (13 - 29)  SpO2: 99% (11-06-17 @ 01:00) (98% - 100%)    Exam:   Awake, Alert, AOX3  PERRL, EOMI, Face equal, Tongue m/l  5/5 throughout, no drift  SILT      acetaminophen    Suspension. 650 milliGRAM(s) Oral every 6 hours PRN  docusate sodium 100 milliGRAM(s) Oral two times a day  enoxaparin Injectable 40 milliGRAM(s) SubCutaneous <User Schedule>  senna 1 Tablet(s) Oral daily  sodium chloride 1 Gram(s) Oral every 6 hours                            15.8   12.2  )-----------( 316      ( 05 Nov 2017 22:54 )             43.0     11-05    134<L>  |  99  |  14  ----------------------------<  127<H>  4.2   |  23  |  1.04    Ca    9.1      05 Nov 2017 22:54  Phos  3.2     11-05  Mg     2.0     11-05      PT/INR - ( 04 Nov 2017 13:20 )   PT: 13.2 sec;   INR: 1.21 ratio         PTT - ( 04 Nov 2017 13:20 )  PTT:35.0 sec    Imaging:
SUMMARY:    Daytime Events: CT head today stable; Angio negative today for aneurysm or AVM-?Cortical vein thrombosis seen    T(C): 36.4 (11-03-17 @ 15:00), Max: 37.6 (11-02-17 @ 21:00)  HR: 86 (11-03-17 @ 19:00) (61 - 91)  BP: 127/78 (11-03-17 @ 19:00) (117/79 - 137/91)  RR: 23 (11-03-17 @ 19:00) (7 - 26)  SpO2: 99% (11-03-17 @ 19:00) (96% - 100%)  Wt(kg): --    Physical Exam:  EXAMINATION:  General: No acute distress  HEENT: Anicteric sclerae  Cardiac: [id number]  Lungs: Clear  Abdomen: Soft, non-tender, +BS  Extremities: No c/c/e  Skin/Incision Site: Clean, dry and intact  Neurologic: Awake, alert, fully oriented, follows commands, PERRL, VFFtc, EOMI, face symmetric, tongue midline, no drift, full strength    IMAGING/DATA: [] Reviewed    docusate sodium 100 milliGRAM(s) Oral two times a day  enoxaparin Injectable 40 milliGRAM(s) SubCutaneous <User Schedule>  oxyCODONE    5 mG/acetaminophen 325 mG 1 Tablet(s) Oral every 6 hours PRN  oxyCODONE    5 mG/acetaminophen 325 mG 2 Tablet(s) Oral every 6 hours PRN  senna 1 Tablet(s) Oral daily  sodium chloride 1 Gram(s) Oral every 6 hours  sodium chloride 0.9% with potassium chloride 20 mEq/L 1000 milliLiter(s) IV Continuous <Continuous>                            16.5   13.0  )-----------( 307      ( 03 Nov 2017 17:14 )             49.2     11-03    134<L>  |  97  |  12  ----------------------------<  79  4.7   |  21<L>  |  1.08    Ca    9.6      03 Nov 2017 17:14  Phos  2.9     11-02  Mg     2.1     11-02    TPro  8.1  /  Alb  4.4  /  TBili  1.2  /  DBili  x   /  AST  27  /  ALT  31  /  AlkPhos  88  11-02      DEVICES:   [] Restraints [] ET tube [] central line [] arterial line [] de la torre [] NGT/OGT [] EVD [] LD [] DAVEY/HMV [] Trach [] PEG [] Chest Tube [] other:
SUMMARY:    Daytime Events: Stroke service consulted    T(C): 37 (11-05-17 @ 15:00), Max: 37.3 (11-05-17 @ 06:00)  HR: 106 (11-05-17 @ 19:00) (61 - 106)  BP: 137/87 (11-05-17 @ 15:00) (129/89 - 140/90)  RR: 22 (11-05-17 @ 19:00) (13 - 22)  SpO2: 98% (11-05-17 @ 19:00) (98% - 100%)  Wt(kg): --    Physical Exam:  EXAMINATION:  General: No acute distress  HEENT: Anicteric sclerae  Cardiac: [id number]  Lungs: Clear  Abdomen: Soft, non-tender, +BS  Extremities: No c/c/e  Skin/Incision Site: Clean, dry and intact  Neurologic: Awake, alert, fully oriented, follows commands, PERRL, VFFtc, EOMI, face symmetric, tongue midline, no drift, full strength    IMAGING/DATA: [] Reviewed    acetaminophen    Suspension. 650 milliGRAM(s) Oral every 6 hours PRN  docusate sodium 100 milliGRAM(s) Oral two times a day  enoxaparin Injectable 40 milliGRAM(s) SubCutaneous <User Schedule>  senna 1 Tablet(s) Oral daily  sodium chloride 1 Gram(s) Oral every 6 hours                            16.1   11.1  )-----------( 321      ( 04 Nov 2017 20:51 )             45.4     11-04    137  |  99  |  14  ----------------------------<  112<H>  5.1   |  24  |  1.22    Ca    9.3      04 Nov 2017 20:51  Phos  3.0     11-04  Mg     2.2     11-04        DEVICES:   [] Restraints [] ET tube [] central line [] arterial line [] de la torre [] NGT/OGT [] EVD [] LD [] DAVEY/HMV [] Trach [] PEG [] Chest Tube [] other:
SUMMARY:HPI:   52M with no significant past medical history presents with severe sudden onset headache on 10/26 that woke him up out of sleep in the morning with associated nausea, vomiting, malaise. Because of persistent symptoms he came to ED where CT head showed trace high R frontal parietal SAH. No history of trauma. He has had associated fevers since headache began, last fever yesterday 101.9. He denies history of valvular disease, IV drug use. Prior to this week he has been feeling healthy although with much stress in his personal life. He was previously on ASA81, last dose 10/26. (02 Nov 2017 17:40)  Underwent angio 11/3, which was negative.    Overnight Events: none reported.    Allergies    No Known Allergies    Intolerances        REVIEW OF SYSTEMS:    [X ] All ROS addressed below are non-contributory,   Neuro: [ ] Headache [ ] Back pain [ ] Numbness [ ] Weakness [ ] Ataxia [ ] Dizziness [ ] Aphasia [ ] Dysarthria [ ] Visual disturbance  Resp: [ ] Shortness of breath/dyspnea, [ ] Orthopnea [ ] Cough  CV: [ ] Chest pain [ ] Palpitation [ ] Lightheadedness [ ] Syncope  Renal: [ ] Thirst [ ] Edema  GI: [ ] Nausea [ ] Emesis [ ] Abdominal pain [ ] Constipation [ ] Diarrhea  Hem: [ ] Hematemesis [ ] bright red blood per rectum  ID: [ ] Fever [ ] Chills [ ] Dysuria  ENT: [ ] Rhinorrhea    DEVICES:        VITALS: [ ] Reviewed  Vital Signs Last 24 Hrs  T(C): 37.4 (06 Nov 2017 11:00), Max: 37.4 (06 Nov 2017 11:00)  T(F): 99.4 (06 Nov 2017 11:00), Max: 99.4 (06 Nov 2017 11:00)  HR: 72 (06 Nov 2017 11:00) (67 - 101)  BP: 135/89 (06 Nov 2017 11:00) (105/67 - 139/83)  BP(mean): 103 (06 Nov 2017 11:00) (77 - 104)  RR: 18 (06 Nov 2017 11:00) (11 - 26)  SpO2: 96% (06 Nov 2017 11:00) (96% - 100%)  CAPILLARY BLOOD GLUCOSE            LABS:    11-06    135  |  98  |  12  ----------------------------<  105<H>  4.6   |  24  |  1.18    Ca    9.7      06 Nov 2017 06:41  Phos  3.2     11-05  Mg     2.0     11-05                            15.8   12.2  )-----------( 316      ( 05 Nov 2017 22:54 )             43.0       STROKE CORE MEASURES:   Hemoglobin A1C, Whole Blood: 5.2 % (11-03 @ 05:50)     MEDICATION LEVELS:       IVF FLUIDS/MEDICATIONS: [X ] Reviewed  MEDICATIONS  (STANDING):  docusate sodium 100 milliGRAM(s) Oral two times a day  senna 1 Tablet(s) Oral daily    MEDICATIONS  (PRN):  acetaminophen    Suspension. 650 milliGRAM(s) Oral every 6 hours PRN Moderate Pain (4 - 6)    I&O's Summary    05 Nov 2017 07:01  -  06 Nov 2017 07:00  --------------------------------------------------------  IN: 1640 mL / OUT: 4470 mL / NET: -2830 mL    06 Nov 2017 07:01  -  06 Nov 2017 20:46  --------------------------------------------------------  IN: 240 mL / OUT: 400 mL / NET: -160 mL        EXAMINATION:  PHYSICAL EXAM:    Constitutional: No Acute Distress     Neurological: Awake, alert oriented to person, place and time, Following Commands, PERRL, EOMI, No Gaze Preference, Face Symmetrical, Speech Fluent, No dysmetria, No ataxia, No nystagmus     Motor exam:          Upper extremity                         Delt     Bicep     Tricep    HG                                                 R         5/5        5/5        5/5       5/5                                               L          5/5        5/5        5/5       5/5          Lower extremity                        HF         KF        KE       DF         PF                                                  R        5/5        5/5        5/5       5/5         5/5                                               L         5/5        5/5       5/5       5/5          5/5                                                 Sensation: [ ] intact to light touch  [ ] decreased:     Reflexes: Deep Tendon Reflexes Intact     Pulmonary: Clear to Auscultation, No rales, No rhonchi, No wheezes     Cardiovascular: S1, S2, Regular rate and rhythm     Gastrointestinal: Soft, Non-tender, Non-distended     Extremities: No calf tenderness
SUMMARY:HPI:  52M with no significant past medical history presents with severe sudden onset headache on 10/26 that woke him up out of sleep in the morning with associated nausea, vomiting, malaise. Because of persistent symptoms he came to ED where CT head showed trace high R frontal parietal SAH. No history of trauma. He has had associated fevers since headache began, last fever yesterday 101.9. He denies history of valvular disease, IV drug use. Prior to this week he has been feeling healthy although with much stress in his personal life. He was previously on ASA81, last dose 10/26. (02 Nov 2017 17:40)  Underwent angio 11/3, which was negative.    Overnight Events: none reported.    Allergies    No Known Allergies    Intolerances        REVIEW OF SYSTEMS:    [ X] All ROS addressed below are non-contributory,   Neuro: [ ] Headache [ ] Back pain [ ] Numbness [ ] Weakness [ ] Ataxia [ ] Dizziness [ ] Aphasia [ ] Dysarthria [ ] Visual disturbance  Resp: [ ] Shortness of breath/dyspnea, [ ] Orthopnea [ ] Cough  CV: [ ] Chest pain [ ] Palpitation [ ] Lightheadedness [ ] Syncope  Renal: [ ] Thirst [ ] Edema  GI: [ ] Nausea [ ] Emesis [ ] Abdominal pain [ ] Constipation [ ] Diarrhea  Hem: [ ] Hematemesis [ ] bright red blood per rectum  ID: [ ] Fever [ ] Chills [ ] Dysuria  ENT: [ ] Rhinorrhea    DEVICES:   None     VITALS: [ X] Reviewed  Vital Signs Last 24 Hrs  T(C): 36.8 (05 Nov 2017 11:00), Max: 37.3 (05 Nov 2017 06:00)  T(F): 98.3 (05 Nov 2017 11:00), Max: 99.1 (05 Nov 2017 06:00)  HR: 84 (05 Nov 2017 11:00) (61 - 87)  BP: 133/92 (05 Nov 2017 11:00) (119/82 - 156/87)  BP(mean): 105 (05 Nov 2017 11:00) (89 - 111)  RR: 20 (05 Nov 2017 11:00) (12 - 21)  SpO2: 98% (05 Nov 2017 11:00) (97% - 100%)  CAPILLARY BLOOD GLUCOSE            LABS:  PT/INR - ( 04 Nov 2017 13:20 )   PT: 13.2 sec;   INR: 1.21 ratio         PTT - ( 04 Nov 2017 13:20 )  PTT:35.0 sec  11-04    137  |  99  |  14  ----------------------------<  112<H>  5.1   |  24  |  1.22    Ca    9.3      04 Nov 2017 20:51  Phos  3.0     11-04  Mg     2.2     11-04                            16.1   11.1  )-----------( 321      ( 04 Nov 2017 20:51 )             45.4       STROKE CORE MEASURES:   Hemoglobin A1C, Whole Blood: 5.2 % (11-03 @ 05:50)       IMAGING/DATA: [ ] Reviewed    IVF FLUIDS/MEDICATIONS: [ ] Reviewed  MEDICATIONS  (STANDING):  docusate sodium 100 milliGRAM(s) Oral two times a day  enoxaparin Injectable 40 milliGRAM(s) SubCutaneous <User Schedule>  senna 1 Tablet(s) Oral daily  sodium chloride 1 Gram(s) Oral every 6 hours       MEDICATIONS  (PRN):  acetaminophen    Suspension. 650 milliGRAM(s) Oral every 6 hours PRN Moderate Pain (4 - 6)    I&O's Summary    04 Nov 2017 08:01  -  05 Nov 2017 07:00  --------------------------------------------------------  IN: 1960 mL / OUT: 4000 mL / NET: -2040 mL    05 Nov 2017 07:01  -  05 Nov 2017 12:36  --------------------------------------------------------  IN: 480 mL / OUT: 375 mL / NET: 105 mL        EXAMINATION:  PHYSICAL EXAM:    Constitutional: No Acute Distress     Neurological: Awake, alert oriented to person, place and time, Following Commands, PERRL, EOMI, No Gaze Preference, Face Symmetrical, Speech Fluent, No dysmetria, No ataxia, No nystagmus     Motor exam:          Upper extremity                         Delt     Bicep     Tricep    HG                                                 R         5/5        5/5        5/5       5/5                                               L          5/5 5/5 5/5 5/5          Lower extremity                        HF         KF        KE       DF         PF                                                  R        5/5 5/5 5/5 5/5 5/5                                               L         5/5 5/5 5/5 5/5 5/5                                                 Sensation: [ X] intact to light touch     Reflexes: Deep Tendon Reflexes Intact     Pulmonary: Clear to Auscultation, No rales, No rhonchi, No wheezes     Cardiovascular: S1, S2, Regular rate and rhythm     Gastrointestinal: Soft, Non-tender, Non-distended     Extremities: No calf tenderness
Patient is a 52y old  Male who presents with a chief complaint of SAH (02 Nov 2017 21:06)      INTERVAL HPI/OVERNIGHT EVENTS:    Pt feeling better, HA BETTER CONTROLLED  and appetite improved. pt was able to ambulate w/o sx  T(C): 37.4 (11-06-17 @ 11:00), Max: 37.4 (11-06-17 @ 11:00)  HR: 72 (11-06-17 @ 11:00) (67 - 101)  BP: 135/89 (11-06-17 @ 11:00) (105/67 - 139/83)  RR: 18 (11-06-17 @ 11:00) (11 - 26)  SpO2: 96% (11-06-17 @ 11:00) (96% - 100%)  Wt(kg): --  I&O's Summary    05 Nov 2017 07:01  -  06 Nov 2017 07:00  --------------------------------------------------------  IN: 1640 mL / OUT: 4470 mL / NET: -2830 mL    06 Nov 2017 07:01  -  06 Nov 2017 22:29  --------------------------------------------------------  IN: 240 mL / OUT: 400 mL / NET: -160 mL        LABS:                        15.8   12.2  )-----------( 316      ( 05 Nov 2017 22:54 )             43.0     11-06    135  |  98  |  12  ----------------------------<  105<H>  4.6   |  24  |  1.18    Ca    9.7      06 Nov 2017 06:41  Phos  3.2     11-05  Mg     2.0     11-05        REVIEW OF SYSTEMS:  CONSTITUTIONAL: No fever, weight loss, + fatigue  EYES: No eye pain, visual disturbances, or discharge  ENMT:  No difficulty hearing, tinnitus, vertigo; No sinus or throat pain  NECK: No pain or stiffness  RESPIRATORY: No cough, wheezing, chills or hemoptysis; No shortness of breath  CARDIOVASCULAR: No chest pain, palpitations, dizziness, or leg swelling  GASTROINTESTINAL: No abdominal or epigastric pain. No nausea, vomiting, or hematemesis; No diarrhea or constipation. No melena or hematochezia.  GENITOURINARY: No dysuria, frequency, hematuria, or incontinence  NEUROLOGICAL: headache, no memory loss, loss of strength, numbness, or tremors  SKIN: No itching, burning, rashes, or lesions   LYMPH NODES: No enlarged glands  ENDOCRINE: No heat or cold intolerance; No hair loss  MUSCULOSKELETAL: No joint pain or swelling; No muscle, back, or extremity pain  PSYCHIATRIC: No depression, anxiety, mood swings, or difficulty sleeping  HEME/LYMPH: No easy bruising, or bleeding gums  ALLERY AND IMMUNOLOGIC: No hives or eczema        Consultant(s) Notes Reviewed:  [x ] YES  [ ] NO    PHYSICAL EXAM:  GENERAL: NAD  HEAD:  Atraumatic, Normocephalic  EYES:  conjunctiva and sclera clear  NECK: Supple, No JVD, Normal thyroid  NERVOUS SYSTEM:  Alert & Oriented X3, Good concentration; Motor Strength 5/5 B/L upper and lower extremities  CHEST/LUNG: Clear to auscultation bilaterally; No rales, rhonchi, wheezing, or rubs  HEART: Regular rate and rhythm; No murmurs, rubs, or gallops  ABDOMEN: Soft, Nontender, Nondistended; Bowel sounds present  EXTREMITIES:   No clubbing, cyanosis, or edema  LYMPH: No lymphadenopathy noted  SKIN: No rashes or lesions    Care Discussed with Consultants/Other Providers [ ] YES  [ ] NO

## 2017-11-06 NOTE — PROGRESS NOTE ADULT - ASSESSMENT
52M with fevers and headache found to have high R frontal SAH without any hx of trauma.  Negative Angio 11/3.   MRI/MRV pending  TCDS in am  f/u hypercoagulable workup  Possible tx to stroke service   PT/OT; OOB tomorrow

## 2017-11-06 NOTE — PROGRESS NOTE ADULT - PROVIDER SPECIALTY LIST ADULT
NSICU
Neurosurgery
Neurosurgery
NSICU
NSICU
Neurosurgery
Internal Medicine
Internal Medicine

## 2017-11-07 LAB
APCR PPP: 2.69 RATIO — SIGNIFICANT CHANGE UP
B2 GLYCOPROT1 AB SER QL: NEGATIVE — SIGNIFICANT CHANGE UP
CARDIOLIPIN AB SER-ACNC: NEGATIVE — SIGNIFICANT CHANGE UP
CULTURE RESULTS: SIGNIFICANT CHANGE UP
CULTURE RESULTS: SIGNIFICANT CHANGE UP
EPO SERPL-MCNC: 9.3 MIU/ML — SIGNIFICANT CHANGE UP (ref 2.6–18.5)
JAK2 P.V617F BLD/T QL: SIGNIFICANT CHANGE UP
SPECIMEN SOURCE: SIGNIFICANT CHANGE UP
SPECIMEN SOURCE: SIGNIFICANT CHANGE UP

## 2017-11-09 ENCOUNTER — TRANSCRIPTION ENCOUNTER (OUTPATIENT)
Age: 52
End: 2017-11-09

## 2017-11-09 DIAGNOSIS — I60.9 NONTRAUMATIC SUBARACHNOID HEMORRHAGE, UNSPECIFIED: ICD-10-CM

## 2017-11-09 PROBLEM — Z00.00 ENCOUNTER FOR PREVENTIVE HEALTH EXAMINATION: Status: ACTIVE | Noted: 2017-11-09

## 2017-11-14 ENCOUNTER — APPOINTMENT (OUTPATIENT)
Dept: NEUROLOGY | Facility: CLINIC | Age: 52
End: 2017-11-14

## 2017-11-24 ENCOUNTER — APPOINTMENT (OUTPATIENT)
Dept: MRI IMAGING | Facility: CLINIC | Age: 52
End: 2017-11-24
Payer: COMMERCIAL

## 2017-11-24 ENCOUNTER — OUTPATIENT (OUTPATIENT)
Dept: OUTPATIENT SERVICES | Facility: HOSPITAL | Age: 52
LOS: 1 days | End: 2017-11-24
Payer: COMMERCIAL

## 2017-11-24 DIAGNOSIS — Z00.8 ENCOUNTER FOR OTHER GENERAL EXAMINATION: ICD-10-CM

## 2017-11-24 PROCEDURE — 70553 MRI BRAIN STEM W/O & W/DYE: CPT

## 2017-11-24 PROCEDURE — 70544 MR ANGIOGRAPHY HEAD W/O DYE: CPT | Mod: 26

## 2017-11-24 PROCEDURE — 70553 MRI BRAIN STEM W/O & W/DYE: CPT | Mod: 26

## 2017-11-24 PROCEDURE — 70544 MR ANGIOGRAPHY HEAD W/O DYE: CPT

## 2017-11-24 PROCEDURE — A9585: CPT

## 2017-12-12 ENCOUNTER — OTHER (OUTPATIENT)
Age: 52
End: 2017-12-12

## 2017-12-12 LAB
ANION GAP SERPL CALC-SCNC: 15 MMOL/L
BUN SERPL-MCNC: 18 MG/DL
CALCIUM SERPL-MCNC: 9.9 MG/DL
CHLORIDE SERPL-SCNC: 99 MMOL/L
CO2 SERPL-SCNC: 26 MMOL/L
CREAT SERPL-MCNC: 1.39 MG/DL
GLUCOSE SERPL-MCNC: 88 MG/DL
POTASSIUM SERPL-SCNC: 4.8 MMOL/L
SODIUM SERPL-SCNC: 140 MMOL/L

## 2017-12-13 ENCOUNTER — APPOINTMENT (OUTPATIENT)
Dept: NEUROSURGERY | Facility: CLINIC | Age: 52
End: 2017-12-13

## 2018-01-07 ENCOUNTER — TRANSCRIPTION ENCOUNTER (OUTPATIENT)
Age: 53
End: 2018-01-07

## 2018-01-08 ENCOUNTER — EMERGENCY (EMERGENCY)
Facility: HOSPITAL | Age: 53
LOS: 1 days | End: 2018-01-08
Attending: EMERGENCY MEDICINE
Payer: COMMERCIAL

## 2018-01-08 VITALS
OXYGEN SATURATION: 99 % | DIASTOLIC BLOOD PRESSURE: 85 MMHG | WEIGHT: 179.9 LBS | HEIGHT: 67 IN | SYSTOLIC BLOOD PRESSURE: 141 MMHG | RESPIRATION RATE: 20 BRPM | HEART RATE: 110 BPM | TEMPERATURE: 98 F

## 2018-01-08 VITALS
DIASTOLIC BLOOD PRESSURE: 90 MMHG | HEART RATE: 90 BPM | TEMPERATURE: 98 F | RESPIRATION RATE: 15 BRPM | SYSTOLIC BLOOD PRESSURE: 130 MMHG | OXYGEN SATURATION: 100 %

## 2018-01-08 LAB
ALBUMIN SERPL ELPH-MCNC: 4.4 G/DL — SIGNIFICANT CHANGE UP (ref 3.3–5)
ALP SERPL-CCNC: 89 U/L — SIGNIFICANT CHANGE UP (ref 40–120)
ALT FLD-CCNC: 31 U/L RC — SIGNIFICANT CHANGE UP (ref 10–45)
ANION GAP SERPL CALC-SCNC: 13 MMOL/L — SIGNIFICANT CHANGE UP (ref 5–17)
APTT BLD: 33.3 SEC — SIGNIFICANT CHANGE UP (ref 27.5–37.4)
AST SERPL-CCNC: 22 U/L — SIGNIFICANT CHANGE UP (ref 10–40)
BASE EXCESS BLDV CALC-SCNC: 2.1 MMOL/L — HIGH (ref -2–2)
BASOPHILS # BLD AUTO: 0.1 K/UL — SIGNIFICANT CHANGE UP (ref 0–0.2)
BASOPHILS NFR BLD AUTO: 0.6 % — SIGNIFICANT CHANGE UP (ref 0–2)
BILIRUB SERPL-MCNC: 0.7 MG/DL — SIGNIFICANT CHANGE UP (ref 0.2–1.2)
BUN SERPL-MCNC: 16 MG/DL — SIGNIFICANT CHANGE UP (ref 7–23)
CA-I SERPL-SCNC: 1.26 MMOL/L — SIGNIFICANT CHANGE UP (ref 1.12–1.3)
CALCIUM SERPL-MCNC: 9.7 MG/DL — SIGNIFICANT CHANGE UP (ref 8.4–10.5)
CHLORIDE BLDV-SCNC: 106 MMOL/L — SIGNIFICANT CHANGE UP (ref 96–108)
CHLORIDE SERPL-SCNC: 102 MMOL/L — SIGNIFICANT CHANGE UP (ref 96–108)
CO2 BLDV-SCNC: 30 MMOL/L — SIGNIFICANT CHANGE UP (ref 22–30)
CO2 SERPL-SCNC: 25 MMOL/L — SIGNIFICANT CHANGE UP (ref 22–31)
CREAT SERPL-MCNC: 1.3 MG/DL — SIGNIFICANT CHANGE UP (ref 0.5–1.3)
EOSINOPHIL # BLD AUTO: 0 K/UL — SIGNIFICANT CHANGE UP (ref 0–0.5)
EOSINOPHIL NFR BLD AUTO: 0.3 % — SIGNIFICANT CHANGE UP (ref 0–6)
GAS PNL BLDV: 138 MMOL/L — SIGNIFICANT CHANGE UP (ref 136–145)
GAS PNL BLDV: SIGNIFICANT CHANGE UP
GAS PNL BLDV: SIGNIFICANT CHANGE UP
GLUCOSE BLDV-MCNC: 138 MG/DL — HIGH (ref 70–99)
GLUCOSE SERPL-MCNC: 153 MG/DL — HIGH (ref 70–99)
HCO3 BLDV-SCNC: 29 MMOL/L — SIGNIFICANT CHANGE UP (ref 21–29)
HCT VFR BLD CALC: 48.1 % — SIGNIFICANT CHANGE UP (ref 39–50)
HCT VFR BLDA CALC: 53 % — HIGH (ref 39–50)
HGB BLD CALC-MCNC: 17.3 G/DL — HIGH (ref 13–17)
HGB BLD-MCNC: 16.8 G/DL — SIGNIFICANT CHANGE UP (ref 13–17)
INR BLD: 1.06 RATIO — SIGNIFICANT CHANGE UP (ref 0.88–1.16)
LACTATE BLDV-MCNC: 2.6 MMOL/L — HIGH (ref 0.7–2)
LYMPHOCYTES # BLD AUTO: 2.6 K/UL — SIGNIFICANT CHANGE UP (ref 1–3.3)
LYMPHOCYTES # BLD AUTO: 26.3 % — SIGNIFICANT CHANGE UP (ref 13–44)
MCHC RBC-ENTMCNC: 32.8 PG — SIGNIFICANT CHANGE UP (ref 27–34)
MCHC RBC-ENTMCNC: 34.9 GM/DL — SIGNIFICANT CHANGE UP (ref 32–36)
MCV RBC AUTO: 93.9 FL — SIGNIFICANT CHANGE UP (ref 80–100)
MONOCYTES # BLD AUTO: 0.4 K/UL — SIGNIFICANT CHANGE UP (ref 0–0.9)
MONOCYTES NFR BLD AUTO: 4.2 % — SIGNIFICANT CHANGE UP (ref 2–14)
NEUTROPHILS # BLD AUTO: 6.6 K/UL — SIGNIFICANT CHANGE UP (ref 1.8–7.4)
NEUTROPHILS NFR BLD AUTO: 68.6 % — SIGNIFICANT CHANGE UP (ref 43–77)
PCO2 BLDV: 54 MMHG — HIGH (ref 35–50)
PH BLDV: 7.35 — SIGNIFICANT CHANGE UP (ref 7.35–7.45)
PLATELET # BLD AUTO: 270 K/UL — SIGNIFICANT CHANGE UP (ref 150–400)
PO2 BLDV: 29 MMHG — SIGNIFICANT CHANGE UP (ref 25–45)
POTASSIUM BLDV-SCNC: 3.8 MMOL/L — SIGNIFICANT CHANGE UP (ref 3.5–5)
POTASSIUM SERPL-MCNC: 4 MMOL/L — SIGNIFICANT CHANGE UP (ref 3.5–5.3)
POTASSIUM SERPL-SCNC: 4 MMOL/L — SIGNIFICANT CHANGE UP (ref 3.5–5.3)
PROT SERPL-MCNC: 7.5 G/DL — SIGNIFICANT CHANGE UP (ref 6–8.3)
PROTHROM AB SERPL-ACNC: 11.6 SEC — SIGNIFICANT CHANGE UP (ref 9.8–12.7)
RBC # BLD: 5.11 M/UL — SIGNIFICANT CHANGE UP (ref 4.2–5.8)
RBC # FLD: 12.7 % — SIGNIFICANT CHANGE UP (ref 10.3–14.5)
SAO2 % BLDV: 53 % — LOW (ref 67–88)
SODIUM SERPL-SCNC: 140 MMOL/L — SIGNIFICANT CHANGE UP (ref 135–145)
WBC # BLD: 9.7 K/UL — SIGNIFICANT CHANGE UP (ref 3.8–10.5)
WBC # FLD AUTO: 9.7 K/UL — SIGNIFICANT CHANGE UP (ref 3.8–10.5)

## 2018-01-08 PROCEDURE — 80053 COMPREHEN METABOLIC PANEL: CPT

## 2018-01-08 PROCEDURE — 82435 ASSAY OF BLOOD CHLORIDE: CPT

## 2018-01-08 PROCEDURE — 82330 ASSAY OF CALCIUM: CPT

## 2018-01-08 PROCEDURE — 96374 THER/PROPH/DIAG INJ IV PUSH: CPT

## 2018-01-08 PROCEDURE — 84132 ASSAY OF SERUM POTASSIUM: CPT

## 2018-01-08 PROCEDURE — 85014 HEMATOCRIT: CPT

## 2018-01-08 PROCEDURE — 82947 ASSAY GLUCOSE BLOOD QUANT: CPT

## 2018-01-08 PROCEDURE — 82803 BLOOD GASES ANY COMBINATION: CPT

## 2018-01-08 PROCEDURE — 70450 CT HEAD/BRAIN W/O DYE: CPT

## 2018-01-08 PROCEDURE — 70450 CT HEAD/BRAIN W/O DYE: CPT | Mod: 26

## 2018-01-08 PROCEDURE — 83605 ASSAY OF LACTIC ACID: CPT

## 2018-01-08 PROCEDURE — 99284 EMERGENCY DEPT VISIT MOD MDM: CPT | Mod: 25

## 2018-01-08 PROCEDURE — 85027 COMPLETE CBC AUTOMATED: CPT

## 2018-01-08 PROCEDURE — 84295 ASSAY OF SERUM SODIUM: CPT

## 2018-01-08 PROCEDURE — 85610 PROTHROMBIN TIME: CPT

## 2018-01-08 PROCEDURE — 99285 EMERGENCY DEPT VISIT HI MDM: CPT

## 2018-01-08 PROCEDURE — 85730 THROMBOPLASTIN TIME PARTIAL: CPT

## 2018-01-08 RX ORDER — LEVETIRACETAM 250 MG/1
1 TABLET, FILM COATED ORAL
Qty: 28 | Refills: 0 | OUTPATIENT
Start: 2018-01-08 | End: 2018-01-21

## 2018-01-08 RX ORDER — ACETAMINOPHEN 500 MG
1000 TABLET ORAL ONCE
Qty: 0 | Refills: 0 | Status: COMPLETED | OUTPATIENT
Start: 2018-01-08 | End: 2018-01-08

## 2018-01-08 RX ORDER — LEVETIRACETAM 250 MG/1
500 TABLET, FILM COATED ORAL
Qty: 0 | Refills: 0 | Status: DISCONTINUED | OUTPATIENT
Start: 2018-01-08 | End: 2018-01-12

## 2018-01-08 RX ORDER — SODIUM CHLORIDE 9 MG/ML
1000 INJECTION INTRAMUSCULAR; INTRAVENOUS; SUBCUTANEOUS ONCE
Qty: 0 | Refills: 0 | Status: COMPLETED | OUTPATIENT
Start: 2018-01-08 | End: 2018-01-08

## 2018-01-08 RX ADMIN — SODIUM CHLORIDE 1000 MILLILITER(S): 9 INJECTION INTRAMUSCULAR; INTRAVENOUS; SUBCUTANEOUS at 10:57

## 2018-01-08 RX ADMIN — Medication 400 MILLIGRAM(S): at 11:45

## 2018-01-08 RX ADMIN — LEVETIRACETAM 500 MILLIGRAM(S): 250 TABLET, FILM COATED ORAL at 13:20

## 2018-01-08 NOTE — ED ADULT TRIAGE NOTE - CHIEF COMPLAINT QUOTE
hx of subarachnoid bleed in november, woke up w left sided weakness that resolved now presented w headache and lightheadedness

## 2018-01-08 NOTE — ED PROVIDER NOTE - CARE PLAN
Principal Discharge DX:	Headache  Instructions for follow-up, activity and diet:	1) You labs and head CT scan were all normal. Your neurologist Dr. Martinez would like for you to start Keppra (anti-seizure medication) 500mg twice a day. You received your first dose here. Please continue taking this medication and follow up with your neurologist for your EEG.   Please follow-up with your primary care doctor within the next 3 days.  Please call today or tomorrow for an appointment.  If you cannot follow-up with your doctor(s), please return to the ED for any urgent issues.  2) If you have any worsening of symptoms or any other concerns please return to the ED immediately.  3) Please continue taking your home medications as directed.  4) You may have been given a copy of your labs and/or imaging.  Please go over these with your primary care doctor.

## 2018-01-08 NOTE — ED PROVIDER NOTE - PLAN OF CARE
1) You labs and head CT scan were all normal. Your neurologist Dr. Martinez would like for you to start Keppra (anti-seizure medication) 500mg twice a day. You received your first dose here. Please continue taking this medication and follow up with your neurologist for your EEG.   Please follow-up with your primary care doctor within the next 3 days.  Please call today or tomorrow for an appointment.  If you cannot follow-up with your doctor(s), please return to the ED for any urgent issues.  2) If you have any worsening of symptoms or any other concerns please return to the ED immediately.  3) Please continue taking your home medications as directed.  4) You may have been given a copy of your labs and/or imaging.  Please go over these with your primary care doctor.

## 2018-01-08 NOTE — ED PROVIDER NOTE - MEDICAL DECISION MAKING DETAILS
51yo M h/o nontraumatic SAH with residual intermittent numb/tingling L arm/leg p/w numb/tingling L arm/leg and occipital HA. Ddx: TIA, SAH, migraine HA. Most likely progression and residual from his Will eval with [facility], labs. Will discuss case with Dr. Alvarado.

## 2018-01-08 NOTE — ED PROVIDER NOTE - OBJECTIVE STATEMENT
53yo M h/o nontraumatic SAH with residual intermittent numb/tingling L arm/leg p/w numb/tingling L arm/leg and occipital HA. Symptoms started at 4am with L sided numbness/tingling/weakness that lasted about 15min and has since resolved. Now with occipital HA and some lightheadedness. Still feeling unsteady on his feet. Occipital HA is throbbing, located behind his R ear. Hasn't taken anything for the pain. Last saw neurologist Dr. Martinez on 1/5/17 with symptoms of intermittent numb/tingling. Pt is to be scheduled for EEG in the future. Prev MRI/MRA, angio was normal.

## 2018-01-08 NOTE — ED PROVIDER NOTE - PHYSICAL EXAMINATION
Vitals: WNL  Gen: laying in bed comfortably  Head: NCAT  ENT: PERRLA, EOMI, sclerae white, anicterus, moist mucous membranes. No exudates. Neck supple, no LAD,  no carotid bruits, no JVD  CV: RRR. Audible S1 and S2. No murmurs, rubs, gallops, S3, nor S4, 2+ radial and DP pulses   Pulm: Clear to auscultation bilaterally. No wheezes, rales, or rhonchi  Abd: soft, normoactive BS x4, NTND, no rebound, no guarding, no rashes  Musculoskeletal:  No peripheral edema  Skin: no lesions or scars noted  Neurologic: AAOx3, CN2-12 intact, figner to nose intact, posterior occiput ttp, R paraspnous m ttp, neg rhomberg, gait intact  : no CVA tenderness  Psych: no SI/HI

## 2018-01-08 NOTE — ED ADULT NURSE NOTE - CHPI ED SYMPTOMS NEG
no loss of consciousness/no nausea/no blurred vision/no fever/no vomiting/no dizziness/no change in level of consciousness/no weakness/no confusion

## 2018-01-08 NOTE — ED PROVIDER NOTE - PROGRESS NOTE DETAILS
Mira De Anda MD PGY1: Case d/w Dr. Martinez - if CT neg, would like pt started on keppra 500mg bid and outpt f/u w him. Pt has EEG scheduled already. Mira De Anda MD PGY1: Pt HA improved with tylenol. CT neg. Will d/c w/ outpt f/u.

## 2018-01-08 NOTE — ED PROVIDER NOTE - NS ED ROS FT
Constitutional: no fevers, chills  HEENT: no visual changes, no sore throat, no rhinorrhea  CV: no cp  Resp: no sob  GI: no abd pain, n/v, diarrhea/constipation  : no dysuria, hematuria  MSK: no joint pains  skin: no rashes  neuro: no HA, no confusion  psych: no SI/HI

## 2018-01-08 NOTE — ED PROVIDER NOTE - ATTENDING CONTRIBUTION TO CARE
52 yom pmhx recent nontraumatic sah that started during an episode of vomiting, admitted and had neg mrv, discharged nov 6th and is being followed by Dr Lim neurology, presents with intermittent numbness and tingling in his left arm and leg w mild occipitalheadache.  has been having the numb feeling intermittently x days, but this AM at 0400 had worsening numbness primarily to left 4/5th fingers lasting 15 min then resolved.  had told his neurologist about these sxs and is scheduled for an EEG in next few days.     ROS:   constitutional - no fever, no chills  eyes - no visual changes, no redness  eent - no sore throat, no nasal congestion  cvs - no chest pain, no leg swelling  resp - no shortness of breath, no cough  gi - no abdominal pain, no vomiting, no diarrhea  gu - no dysuria, no hematuria  msk - no acute back pain, no joint swelling  skin - no rashes, no jaundice  neuro - + headache, no focal weakness  psych - no acute mental health issue     Physical Exam:   constitutional - well appearing, awake and alert, oriented x3  head - no external evidence of trauma  cvs - rrr, no murmurs, no peripheral edema  resp - breath sounds clear and equal bilat  gi - abdomen soft and nontender, no rigidity, guarding or rebound, bowel sounds present  msk - moving all extremities spontaneously  neuro - alert and oriented x3, no focal deficits, CNs 2-12 grossly intact. no focal weaknesses, sensation is grossly intact. no pronator drift.   skin- no jaundice, warm and dry  psych - mood and affect wnl, no apparent risk to self or others     ? recurrent sah (unlikely as pt is well appearing, neuro intact at this time) vs recrudescence of sxs vs very minor focal seizure. ct with resolving sah, pt w no recurrent episodes of numbness in hand. pt initially declined meds for headache but did accept tylenol. discussed management w neuro who recommends starting on keppra, close f/u for eeg and further eval w dr lim. additional verbal instructions regarding diagnosis, return precautions and follow up plan given to pt and/or family. JOE Sims MD

## 2018-01-12 ENCOUNTER — APPOINTMENT (OUTPATIENT)
Dept: MRI IMAGING | Facility: CLINIC | Age: 53
End: 2018-01-12
Payer: COMMERCIAL

## 2018-01-12 ENCOUNTER — OUTPATIENT (OUTPATIENT)
Dept: OUTPATIENT SERVICES | Facility: HOSPITAL | Age: 53
LOS: 1 days | End: 2018-01-12
Payer: COMMERCIAL

## 2018-01-12 DIAGNOSIS — Z00.8 ENCOUNTER FOR OTHER GENERAL EXAMINATION: ICD-10-CM

## 2018-01-12 PROCEDURE — 70551 MRI BRAIN STEM W/O DYE: CPT | Mod: 26

## 2018-01-12 PROCEDURE — 70551 MRI BRAIN STEM W/O DYE: CPT

## 2018-01-30 ENCOUNTER — TRANSCRIPTION ENCOUNTER (OUTPATIENT)
Age: 53
End: 2018-01-30

## 2018-02-02 ENCOUNTER — OUTPATIENT (OUTPATIENT)
Dept: OUTPATIENT SERVICES | Facility: HOSPITAL | Age: 53
LOS: 1 days | End: 2018-02-02
Payer: COMMERCIAL

## 2018-02-02 VITALS
SYSTOLIC BLOOD PRESSURE: 130 MMHG | RESPIRATION RATE: 16 BRPM | DIASTOLIC BLOOD PRESSURE: 82 MMHG | HEIGHT: 66.5 IN | TEMPERATURE: 98 F | OXYGEN SATURATION: 97 % | WEIGHT: 190.04 LBS | HEART RATE: 82 BPM

## 2018-02-02 DIAGNOSIS — I67.1 CEREBRAL ANEURYSM, NONRUPTURED: ICD-10-CM

## 2018-02-02 DIAGNOSIS — Z01.818 ENCOUNTER FOR OTHER PREPROCEDURAL EXAMINATION: ICD-10-CM

## 2018-02-02 DIAGNOSIS — I60.9 NONTRAUMATIC SUBARACHNOID HEMORRHAGE, UNSPECIFIED: Chronic | ICD-10-CM

## 2018-02-02 DIAGNOSIS — I60.9 NONTRAUMATIC SUBARACHNOID HEMORRHAGE, UNSPECIFIED: ICD-10-CM

## 2018-02-02 LAB
ANION GAP SERPL CALC-SCNC: 18 MMOL/L — HIGH (ref 5–17)
BLD GP AB SCN SERPL QL: NEGATIVE — SIGNIFICANT CHANGE UP
BUN SERPL-MCNC: 18 MG/DL — SIGNIFICANT CHANGE UP (ref 7–23)
CALCIUM SERPL-MCNC: 10.3 MG/DL — SIGNIFICANT CHANGE UP (ref 8.4–10.5)
CHLORIDE SERPL-SCNC: 99 MMOL/L — SIGNIFICANT CHANGE UP (ref 96–108)
CO2 SERPL-SCNC: 21 MMOL/L — LOW (ref 22–31)
CREAT SERPL-MCNC: 1.4 MG/DL — HIGH (ref 0.5–1.3)
GLUCOSE SERPL-MCNC: 67 MG/DL — LOW (ref 70–99)
HCT VFR BLD CALC: 49.1 % — SIGNIFICANT CHANGE UP (ref 39–50)
HGB BLD-MCNC: 17.4 G/DL — HIGH (ref 13–17)
MCHC RBC-ENTMCNC: 32.5 PG — SIGNIFICANT CHANGE UP (ref 27–34)
MCHC RBC-ENTMCNC: 35.4 GM/DL — SIGNIFICANT CHANGE UP (ref 32–36)
MCV RBC AUTO: 91.6 FL — SIGNIFICANT CHANGE UP (ref 80–100)
PLATELET # BLD AUTO: 260 K/UL — SIGNIFICANT CHANGE UP (ref 150–400)
POTASSIUM SERPL-MCNC: 4.3 MMOL/L — SIGNIFICANT CHANGE UP (ref 3.5–5.3)
POTASSIUM SERPL-SCNC: 4.3 MMOL/L — SIGNIFICANT CHANGE UP (ref 3.5–5.3)
RBC # BLD: 5.36 M/UL — SIGNIFICANT CHANGE UP (ref 4.2–5.8)
RBC # FLD: 13.8 % — SIGNIFICANT CHANGE UP (ref 10.3–14.5)
RH IG SCN BLD-IMP: POSITIVE — SIGNIFICANT CHANGE UP
SODIUM SERPL-SCNC: 138 MMOL/L — SIGNIFICANT CHANGE UP (ref 135–145)
WBC # BLD: 9.23 K/UL — SIGNIFICANT CHANGE UP (ref 3.8–10.5)
WBC # FLD AUTO: 9.23 K/UL — SIGNIFICANT CHANGE UP (ref 3.8–10.5)

## 2018-02-02 PROCEDURE — 85027 COMPLETE CBC AUTOMATED: CPT

## 2018-02-02 PROCEDURE — 86901 BLOOD TYPING SEROLOGIC RH(D): CPT

## 2018-02-02 PROCEDURE — 86900 BLOOD TYPING SEROLOGIC ABO: CPT

## 2018-02-02 PROCEDURE — 80048 BASIC METABOLIC PNL TOTAL CA: CPT

## 2018-02-02 PROCEDURE — 86850 RBC ANTIBODY SCREEN: CPT

## 2018-02-02 PROCEDURE — G0463: CPT

## 2018-02-02 RX ORDER — ACETAMINOPHEN 500 MG
2 TABLET ORAL
Qty: 0 | Refills: 0 | COMMUNITY

## 2018-02-02 NOTE — H&P PST ADULT - HISTORY OF PRESENT ILLNESS
52M with no significant past medical history presents with severe sudden onset headache on 10/26 that woke him up out of sleep in the morning with associated nausea, vomiting, malaise. Because of persistent symptoms he came to ED where CT head showed trace high R frontal parietal SAH. No history of trauma. He has had associated fevers since headache began, last fever yesterday 101.9. He denies history of valvular disease, IV drug use. Prior to this week he has been feeling healthy although with much stress in his personal life. He was previously on ASA81, last dose 10/26. This is a 53 y/o male with no significant PMH. States:  24 hours after receiving TDAP Vaccine( 10/2017): s/s of sudden onset headache. Presented to [facility], dx: Right Frontal Parietal SAH. No surgery required. Now scheduled for routine Cerebral Angiogram.

## 2018-02-02 NOTE — H&P PST ADULT - NSANTHOSAYNRD_GEN_A_CORE
No. WOODROW screening performed.  STOP BANG Legend: 0-2 = LOW Risk; 3-4 = INTERMEDIATE Risk; 5-8 = HIGH Risk No. WOODROW screening performed.  STOP BANG Legend: 0-2 = LOW Risk; 3-4 = INTERMEDIATE Risk; 5-8 = HIGH Risk/Neck 16.5 in.

## 2018-02-07 ENCOUNTER — FORM ENCOUNTER (OUTPATIENT)
Age: 53
End: 2018-02-07

## 2018-02-08 ENCOUNTER — OUTPATIENT (OUTPATIENT)
Dept: OUTPATIENT SERVICES | Facility: HOSPITAL | Age: 53
LOS: 1 days | End: 2018-02-08
Payer: COMMERCIAL

## 2018-02-08 ENCOUNTER — APPOINTMENT (OUTPATIENT)
Dept: NEUROLOGY | Facility: CLINIC | Age: 53
End: 2018-02-08

## 2018-02-08 DIAGNOSIS — I67.1 CEREBRAL ANEURYSM, NONRUPTURED: ICD-10-CM

## 2018-02-08 DIAGNOSIS — I60.9 NONTRAUMATIC SUBARACHNOID HEMORRHAGE, UNSPECIFIED: Chronic | ICD-10-CM

## 2018-02-08 LAB
ANION GAP SERPL CALC-SCNC: 15 MMOL/L — SIGNIFICANT CHANGE UP (ref 5–17)
BASOPHILS # BLD AUTO: 0 K/UL — SIGNIFICANT CHANGE UP (ref 0–0.2)
BASOPHILS NFR BLD AUTO: 0.6 % — SIGNIFICANT CHANGE UP (ref 0–2)
BUN SERPL-MCNC: 10 MG/DL — SIGNIFICANT CHANGE UP (ref 7–23)
CALCIUM SERPL-MCNC: 8.9 MG/DL — SIGNIFICANT CHANGE UP (ref 8.4–10.5)
CHLORIDE SERPL-SCNC: 102 MMOL/L — SIGNIFICANT CHANGE UP (ref 96–108)
CO2 SERPL-SCNC: 21 MMOL/L — LOW (ref 22–31)
CREAT SERPL-MCNC: 1.15 MG/DL — SIGNIFICANT CHANGE UP (ref 0.5–1.3)
EOSINOPHIL # BLD AUTO: 0.1 K/UL — SIGNIFICANT CHANGE UP (ref 0–0.5)
EOSINOPHIL NFR BLD AUTO: 1.1 % — SIGNIFICANT CHANGE UP (ref 0–6)
GLUCOSE SERPL-MCNC: 111 MG/DL — HIGH (ref 70–99)
HCT VFR BLD CALC: 43.8 % — SIGNIFICANT CHANGE UP (ref 39–50)
HGB BLD-MCNC: 15.9 G/DL — SIGNIFICANT CHANGE UP (ref 13–17)
LYMPHOCYTES # BLD AUTO: 2.7 K/UL — SIGNIFICANT CHANGE UP (ref 1–3.3)
LYMPHOCYTES # BLD AUTO: 35.1 % — SIGNIFICANT CHANGE UP (ref 13–44)
MCHC RBC-ENTMCNC: 33.6 PG — SIGNIFICANT CHANGE UP (ref 27–34)
MCHC RBC-ENTMCNC: 36.2 GM/DL — HIGH (ref 32–36)
MCV RBC AUTO: 92.8 FL — SIGNIFICANT CHANGE UP (ref 80–100)
MONOCYTES # BLD AUTO: 0.6 K/UL — SIGNIFICANT CHANGE UP (ref 0–0.9)
MONOCYTES NFR BLD AUTO: 7.9 % — SIGNIFICANT CHANGE UP (ref 2–14)
NEUTROPHILS # BLD AUTO: 4.3 K/UL — SIGNIFICANT CHANGE UP (ref 1.8–7.4)
NEUTROPHILS NFR BLD AUTO: 55.2 % — SIGNIFICANT CHANGE UP (ref 43–77)
PLATELET # BLD AUTO: 217 K/UL — SIGNIFICANT CHANGE UP (ref 150–400)
POTASSIUM SERPL-MCNC: 4.1 MMOL/L — SIGNIFICANT CHANGE UP (ref 3.5–5.3)
POTASSIUM SERPL-SCNC: 4.1 MMOL/L — SIGNIFICANT CHANGE UP (ref 3.5–5.3)
RBC # BLD: 4.72 M/UL — SIGNIFICANT CHANGE UP (ref 4.2–5.8)
RBC # FLD: 12.4 % — SIGNIFICANT CHANGE UP (ref 10.3–14.5)
SODIUM SERPL-SCNC: 138 MMOL/L — SIGNIFICANT CHANGE UP (ref 135–145)
WBC # BLD: 7.8 K/UL — SIGNIFICANT CHANGE UP (ref 3.8–10.5)
WBC # FLD AUTO: 7.8 K/UL — SIGNIFICANT CHANGE UP (ref 3.8–10.5)

## 2018-02-08 PROCEDURE — 36227 PLACE CATH XTRNL CAROTID: CPT

## 2018-02-08 PROCEDURE — C1887: CPT

## 2018-02-08 PROCEDURE — C1769: CPT

## 2018-02-08 PROCEDURE — 85027 COMPLETE CBC AUTOMATED: CPT

## 2018-02-08 PROCEDURE — 36223 PLACE CATH CAROTID/INOM ART: CPT | Mod: 59,LT

## 2018-02-08 PROCEDURE — 36223 PLACE CATH CAROTID/INOM ART: CPT | Mod: 59

## 2018-02-08 PROCEDURE — 36226 PLACE CATH VERTEBRAL ART: CPT

## 2018-02-08 PROCEDURE — 80048 BASIC METABOLIC PNL TOTAL CA: CPT

## 2018-02-08 PROCEDURE — 36226 PLACE CATH VERTEBRAL ART: CPT | Mod: 50

## 2018-02-08 PROCEDURE — 36224 PLACE CATH CAROTD ART: CPT | Mod: RT

## 2018-02-08 PROCEDURE — C1894: CPT

## 2018-02-08 PROCEDURE — 36227 PLACE CATH XTRNL CAROTID: CPT | Mod: 50

## 2018-02-08 PROCEDURE — 36224 PLACE CATH CAROTD ART: CPT

## 2018-02-08 RX ORDER — SODIUM CHLORIDE 9 MG/ML
1000 INJECTION INTRAMUSCULAR; INTRAVENOUS; SUBCUTANEOUS
Qty: 0 | Refills: 0 | Status: DISCONTINUED | OUTPATIENT
Start: 2018-02-08 | End: 2018-02-23

## 2018-02-08 NOTE — CHART NOTE - NSCHARTNOTEFT_GEN_A_CORE
Interventional Neuro- Radiology   Procedure Note MARY    Procedure: Selective Cerebral Angiography   Pre- Procedure Diagnosis:  Post- Procedure Diagnosis:    : Dr. Faustino Farmer MD    Physician Assistant: Monet Dent PA-C    RN:    Anesthesiologist: Dr Leah Cannon    Sheath:    I/Os:  Estimated blood loss less than 10cc  IV fluids:     cc     Urine output     cc        Contrast Omnipaque 240      cc         Antibiotics:    Vitals: BP         HR      Spo2    %      Spo2    %    Preliminary Report:    Using a 4 Fr short/long sheath to the right groin under MAC sedation via   left vertebral artery,  left common carotid artery, left external carotid artey, right vertebral arter,  right common carotid artery, right external carotid artery  a selective cerebral angiography was performed and  demonstrates ________. ( Official note to follow).  Patient tolerated procedure well, hemodynamically stable, no change in neurological status compared to baseline.  Results discussed with neurosurgery, patient and patient's  family.  Groin sheath was removed,  manual compression held to hemostasis  for  21 minutes, no active bleeding, no hematoma, avitene applied,  quick clot and safeguard balloon dressing applied at _____h.  STAT labs:  CBC BMP  ____h.  Patient transferred to Recovery Room Interventional Neuro- Radiology   Procedure Note PA-C    Procedure: Selective Cerebral Angiography   Pre- Procedure Diagnosis:  SAH  Post- Procedure Diagnosis: SAH no source for hemorrhage    : Dr Eduar Stewart  Fellow:      Dr Kym Ibrahim  Resident   Dr Javy Holder    Physician Assistant: Monet Dent PA-C  Nurse:                      Abril Diane RN  Radiologic Tech:       Roberto Hendersonlando LRT      Anesthesiologist:      Dr Leah Cannon  Sheath:                    4 [place] citizen short sheath     I/Os:  Estimated blood loss less than 10cc  IV fluids:500cc  Urine output due to void  Contrast Omnipaque 240 107cc           Vitals: /72     HR88    Spo2 100%    Preliminary Report:  Using a 4 Fr short/long sheath to the right groin under MAC sedation via left vertebral artery, left common carotid artery, left external carotid artery, right vertebral artery, right common carotid artery, right external carotid artery a selective cerebral angiography was performed and demonstrated no aneurysms and no vascular malformation on long term follow up. Patient tolerated procedure well, hemodynamically stable, no change in neurological status compared to baseline. Results discussed with neurosurgery, patient and patient's  family.  Groin sheath was removed, manual compression held to hemostasis  for  21 minutes, no active bleeding, no hematoma, quick clot and safeguard balloon dressing applied at 8:30am  STAT labs: CBC BMP at 1200 noon. Patient transferred to Recovery Room 1st floor.

## 2018-02-08 NOTE — CHART NOTE - NSCHARTNOTEFT_GEN_A_CORE
Interventional Neuro Radiology  Pre-Procedure Note PA-C    This is a 52y ____ hand dominant Male            Allergies: No Known Allergies  Vertigo: intermittent  SAH (subarachnoid hemorrhage): 10/2017  SAH (subarachnoid hemorrhage): 11/2017   Cerebral Angiogram      Social History:     FAMILY HISTORY:  No pertinent family history in first degree relatives      Current Medications:     Labs:                   Blood Bank:       Assessment/Plan:     This is a 52y  year old right  hand dominant Male  presents with   Patient presents to neuro-IR for selective cerebral angiography.   Procedure, goals, risks, benefits and alternatives  were discussed with patient and patient's family.  All questions were answered.  Risks include but are not limited to stroke, vessel injury, hemorrhage, and or right  groin hematoma.  Patient demonstrates understanding  of all risks involved with this procedure and wishes to continue.   Appropriate  content was obtained from patient and consent is in the patient's chart. Interventional Neuro Radiology  Pre-Procedure Note PA-C    This is a 52 year old right hand dominant male            Allergies: No Known Allergies  PMHX: Vertigo, SAH   PSHX:11/2017   Cerebral Angiogram  Social History:   FAMILY HISTORY: No pertinent family history in first degree relatives         Current Medications: Complete Blood Count     WBC Count: 9.23 K/uL    Hemoglobin: 17.4 g/dL    Hematocrit: 49.1    Platelet Count  260 K/uL    Basic Metabolic Panel (02.02.18 @ 20:49)    Sodium, Serum: 138 mmol/L    Potassium, Serum: 4.3 mmol/L    Chloride, Serum: 99 mmol/L    Carbon Dioxide, Serum: 21 mmol/L    Anion Gap, Serum: 18 mmol/L    Blood Urea Nitrogen, Serum: 18 mg/dL    Creatinine, Serum: 1.40 mg/dL    Glucose, Serum: 67 mg/dL    Blood Bank:       Assessment/Plan:     This is a 52y  year old right  hand dominant Male  presents with   Patient presents to neuro-IR for selective cerebral angiography.   Procedure, goals, risks, benefits and alternatives  were discussed with patient and patient's family.  All questions were answered.  Risks include but are not limited to stroke, vessel injury, hemorrhage, and or right  groin hematoma.  Patient demonstrates understanding  of all risks involved with this procedure and wishes to continue.   Appropriate  content was obtained from patient and consent is in the patient's chart. Interventional Neuro Radiology  Pre-Procedure Note PA-C    This is a 52 year old right hand dominant male with a PMHX significant for vertigo and SAH. Patient presents to Neuro IR for a selective cerebral angiography. Upon exam patient is A + O 3, speech is fluent, recent and remote memory are intact and moves all extremities.    Allergies: No Known Allergies  PMHX: Vertigo, SAH   PSHX: 11/2017 diagnostic cerebral angiogram  Social History: non-smoker    FAMILY HISTORY: No pertinent family history in first degree relatives      Complete Blood Count     WBC Count: 9.23     Hemoglobin: 17.4     Hematocrit: 49.1    Platelet Count  260     Basic Metabolic Panel     Sodium, Serum: 138 mmol/L    Potassium, Serum: 4.3 mmol/L    Chloride, Serum: 99 mmol/L    Carbon Dioxide, Serum: 21 mmol/L    Anion Gap, Serum: 18 mmol/L    Blood Urea Nitrogen, Serum: 18 mg/dL    Creatinine, Serum: 1.40 mg/dL    Glucose, Serum: 67 mg/dL    Blood Bank: O positive available    Assessment/Plan:   This is a 52 year old right hand dominant male with a PMHX of SAH. Patient returns to Neuro IR for a selective cerebral angiography to determine a source for hemorrhage. Procedure, goals, risks, benefits and alternatives were discussed with patient and patient's sister. All questions were answered. Risks include but are not limited to stroke, vessel injury, hemorrhage, and or right groin hematoma. Patient demonstrates understanding of all risks involved with this procedure and wishes to continue. Appropriate content was obtained from patient and consent is in the patient's chart.

## 2018-02-13 DIAGNOSIS — I60.9 NONTRAUMATIC SUBARACHNOID HEMORRHAGE, UNSPECIFIED: ICD-10-CM

## 2018-07-16 PROBLEM — I60.9 NONTRAUMATIC SUBARACHNOID HEMORRHAGE, UNSPECIFIED: Chronic | Status: INACTIVE | Noted: 2018-01-08 | Resolved: 2018-02-02

## 2018-11-12 ENCOUNTER — TRANSCRIPTION ENCOUNTER (OUTPATIENT)
Age: 53
End: 2018-11-12

## 2020-06-28 ENCOUNTER — TRANSCRIPTION ENCOUNTER (OUTPATIENT)
Age: 55
End: 2020-06-28

## 2020-07-22 NOTE — CHART NOTE - NSCHARTNOTESELECT_GEN_ALL_CORE
Event Note/Interventional Neuro Radiology
Interventional Neuro Radiology/Event Note
TCD/Off Service Note
done

## 2020-09-21 PROBLEM — I60.9 NONTRAUMATIC SUBARACHNOID HEMORRHAGE, UNSPECIFIED: Chronic | Status: ACTIVE | Noted: 2018-02-02

## 2020-09-21 PROBLEM — R42 DIZZINESS AND GIDDINESS: Chronic | Status: ACTIVE | Noted: 2018-02-02

## 2020-09-27 ENCOUNTER — APPOINTMENT (OUTPATIENT)
Dept: MRI IMAGING | Facility: CLINIC | Age: 55
End: 2020-09-27
Payer: MEDICARE

## 2020-09-27 ENCOUNTER — OUTPATIENT (OUTPATIENT)
Dept: OUTPATIENT SERVICES | Facility: HOSPITAL | Age: 55
LOS: 1 days | End: 2020-09-27
Payer: MEDICARE

## 2020-09-27 DIAGNOSIS — Z00.8 ENCOUNTER FOR OTHER GENERAL EXAMINATION: ICD-10-CM

## 2020-09-27 DIAGNOSIS — I60.9 NONTRAUMATIC SUBARACHNOID HEMORRHAGE, UNSPECIFIED: Chronic | ICD-10-CM

## 2020-09-27 PROCEDURE — 70544 MR ANGIOGRAPHY HEAD W/O DYE: CPT | Mod: 26,59

## 2020-09-27 PROCEDURE — 70551 MRI BRAIN STEM W/O DYE: CPT | Mod: 26

## 2020-09-27 PROCEDURE — 70551 MRI BRAIN STEM W/O DYE: CPT

## 2020-09-27 PROCEDURE — 70544 MR ANGIOGRAPHY HEAD W/O DYE: CPT

## 2021-11-01 ENCOUNTER — APPOINTMENT (OUTPATIENT)
Dept: MRI IMAGING | Facility: CLINIC | Age: 56
End: 2021-11-01

## 2021-11-27 ENCOUNTER — APPOINTMENT (OUTPATIENT)
Dept: MRI IMAGING | Facility: CLINIC | Age: 56
End: 2021-11-27
